# Patient Record
Sex: FEMALE | Race: OTHER | HISPANIC OR LATINO | URBAN - METROPOLITAN AREA
[De-identification: names, ages, dates, MRNs, and addresses within clinical notes are randomized per-mention and may not be internally consistent; named-entity substitution may affect disease eponyms.]

---

## 2017-08-20 ENCOUNTER — INPATIENT (INPATIENT)
Facility: HOSPITAL | Age: 27
LOS: 2 days | Discharge: HOME | DRG: 872 | End: 2017-08-23
Attending: HOSPITALIST | Admitting: HOSPITALIST
Payer: MEDICARE

## 2017-08-20 VITALS
OXYGEN SATURATION: 99 % | TEMPERATURE: 102 F | HEIGHT: 62 IN | HEART RATE: 139 BPM | RESPIRATION RATE: 20 BRPM | DIASTOLIC BLOOD PRESSURE: 92 MMHG | SYSTOLIC BLOOD PRESSURE: 133 MMHG | WEIGHT: 149.91 LBS

## 2017-08-20 DIAGNOSIS — A41.9 SEPSIS, UNSPECIFIED ORGANISM: ICD-10-CM

## 2017-08-20 DIAGNOSIS — D89.9 DISORDER INVOLVING THE IMMUNE MECHANISM, UNSPECIFIED: ICD-10-CM

## 2017-08-20 DIAGNOSIS — Z94.0 KIDNEY TRANSPLANT STATUS: ICD-10-CM

## 2017-08-20 DIAGNOSIS — R50.9 FEVER, UNSPECIFIED: ICD-10-CM

## 2017-08-20 DIAGNOSIS — Z94.4 LIVER TRANSPLANT STATUS: ICD-10-CM

## 2017-08-20 LAB
ALBUMIN SERPL ELPH-MCNC: 3.9 G/DL — SIGNIFICANT CHANGE UP (ref 3.3–5.2)
ALP SERPL-CCNC: 77 U/L — SIGNIFICANT CHANGE UP (ref 40–120)
ALT FLD-CCNC: 41 U/L — HIGH
ANION GAP SERPL CALC-SCNC: 16 MMOL/L — SIGNIFICANT CHANGE UP (ref 5–17)
APPEARANCE UR: ABNORMAL
AST SERPL-CCNC: 46 U/L — HIGH
BACTERIA # UR AUTO: ABNORMAL
BASOPHILS # BLD AUTO: 0 K/UL — SIGNIFICANT CHANGE UP (ref 0–0.2)
BASOPHILS NFR BLD AUTO: 0.1 % — SIGNIFICANT CHANGE UP (ref 0–2)
BILIRUB SERPL-MCNC: 0.5 MG/DL — SIGNIFICANT CHANGE UP (ref 0.4–2)
BILIRUB UR-MCNC: NEGATIVE — SIGNIFICANT CHANGE UP
BUN SERPL-MCNC: 9 MG/DL — SIGNIFICANT CHANGE UP (ref 8–20)
CALCIUM SERPL-MCNC: 9.2 MG/DL — SIGNIFICANT CHANGE UP (ref 8.6–10.2)
CHLORIDE SERPL-SCNC: 100 MMOL/L — SIGNIFICANT CHANGE UP (ref 98–107)
CO2 SERPL-SCNC: 18 MMOL/L — LOW (ref 22–29)
COLOR SPEC: YELLOW — SIGNIFICANT CHANGE UP
CREAT SERPL-MCNC: 0.7 MG/DL — SIGNIFICANT CHANGE UP (ref 0.5–1.3)
DIFF PNL FLD: ABNORMAL
EPI CELLS # UR: SIGNIFICANT CHANGE UP
GLUCOSE SERPL-MCNC: 149 MG/DL — HIGH (ref 70–115)
GLUCOSE UR QL: 100 MG/DL
HCG SERPL-ACNC: <2 MIU/ML — SIGNIFICANT CHANGE UP
HCT VFR BLD CALC: 38.5 % — SIGNIFICANT CHANGE UP (ref 37–47)
HGB BLD-MCNC: 12.6 G/DL — SIGNIFICANT CHANGE UP (ref 12–16)
KETONES UR-MCNC: ABNORMAL
LACTATE BLDV-MCNC: 1.2 MMOL/L — SIGNIFICANT CHANGE UP (ref 0.5–2)
LEUKOCYTE ESTERASE UR-ACNC: ABNORMAL
LYMPHOCYTES # BLD AUTO: 0.9 K/UL — LOW (ref 1–4.8)
LYMPHOCYTES # BLD AUTO: 6.3 % — LOW (ref 20–55)
MAGNESIUM SERPL-MCNC: 1.5 MG/DL — LOW (ref 1.6–2.6)
MCHC RBC-ENTMCNC: 28.1 PG — SIGNIFICANT CHANGE UP (ref 27–31)
MCHC RBC-ENTMCNC: 32.7 G/DL — SIGNIFICANT CHANGE UP (ref 32–36)
MCV RBC AUTO: 85.9 FL — SIGNIFICANT CHANGE UP (ref 81–99)
MONOCYTES # BLD AUTO: 1.4 K/UL — HIGH (ref 0–0.8)
MONOCYTES NFR BLD AUTO: 10 % — SIGNIFICANT CHANGE UP (ref 3–10)
NEUTROPHILS # BLD AUTO: 11.5 K/UL — HIGH (ref 1.8–8)
NEUTROPHILS NFR BLD AUTO: 83.2 % — HIGH (ref 37–73)
NITRITE UR-MCNC: NEGATIVE — SIGNIFICANT CHANGE UP
PH UR: 6.5 — SIGNIFICANT CHANGE UP (ref 5–8)
PLATELET # BLD AUTO: 236 K/UL — SIGNIFICANT CHANGE UP (ref 150–400)
POTASSIUM SERPL-MCNC: 4.5 MMOL/L — SIGNIFICANT CHANGE UP (ref 3.5–5.3)
POTASSIUM SERPL-SCNC: 4.5 MMOL/L — SIGNIFICANT CHANGE UP (ref 3.5–5.3)
PROT SERPL-MCNC: 7.9 G/DL — SIGNIFICANT CHANGE UP (ref 6.6–8.7)
PROT UR-MCNC: NEGATIVE MG/DL — SIGNIFICANT CHANGE UP
RBC # BLD: 4.48 M/UL — SIGNIFICANT CHANGE UP (ref 4.4–5.2)
RBC # FLD: 13.6 % — SIGNIFICANT CHANGE UP (ref 11–15.6)
RBC CASTS # UR COMP ASSIST: ABNORMAL /HPF (ref 0–4)
SODIUM SERPL-SCNC: 134 MMOL/L — LOW (ref 135–145)
SP GR SPEC: 1 — LOW (ref 1.01–1.02)
UROBILINOGEN FLD QL: NEGATIVE MG/DL — SIGNIFICANT CHANGE UP
WBC # BLD: 13.8 K/UL — HIGH (ref 4.8–10.8)
WBC # FLD AUTO: 13.8 K/UL — HIGH (ref 4.8–10.8)
WBC UR QL: ABNORMAL

## 2017-08-20 PROCEDURE — 99223 1ST HOSP IP/OBS HIGH 75: CPT

## 2017-08-20 PROCEDURE — 93010 ELECTROCARDIOGRAM REPORT: CPT

## 2017-08-20 PROCEDURE — 99291 CRITICAL CARE FIRST HOUR: CPT

## 2017-08-20 PROCEDURE — 71010: CPT | Mod: 26

## 2017-08-20 PROCEDURE — 99233 SBSQ HOSP IP/OBS HIGH 50: CPT

## 2017-08-20 PROCEDURE — 74176 CT ABD & PELVIS W/O CONTRAST: CPT | Mod: 26

## 2017-08-20 RX ORDER — LINEZOLID 600 MG/300ML
INJECTION, SOLUTION INTRAVENOUS
Qty: 0 | Refills: 0 | Status: DISCONTINUED | OUTPATIENT
Start: 2017-08-20 | End: 2017-08-23

## 2017-08-20 RX ORDER — ENOXAPARIN SODIUM 100 MG/ML
40 INJECTION SUBCUTANEOUS DAILY
Qty: 0 | Refills: 0 | Status: DISCONTINUED | OUTPATIENT
Start: 2017-08-20 | End: 2017-08-23

## 2017-08-20 RX ORDER — VANCOMYCIN HCL 1 G
1500 VIAL (EA) INTRAVENOUS ONCE
Qty: 0 | Refills: 0 | Status: COMPLETED | OUTPATIENT
Start: 2017-08-20 | End: 2017-08-20

## 2017-08-20 RX ORDER — ERTAPENEM SODIUM 1 G/1
1000 INJECTION, POWDER, LYOPHILIZED, FOR SOLUTION INTRAMUSCULAR; INTRAVENOUS EVERY 24 HOURS
Qty: 0 | Refills: 0 | Status: DISCONTINUED | OUTPATIENT
Start: 2017-08-21 | End: 2017-08-23

## 2017-08-20 RX ORDER — SODIUM CHLORIDE 9 MG/ML
500 INJECTION INTRAMUSCULAR; INTRAVENOUS; SUBCUTANEOUS ONCE
Qty: 0 | Refills: 0 | Status: COMPLETED | OUTPATIENT
Start: 2017-08-20 | End: 2017-08-20

## 2017-08-20 RX ORDER — HYDROMORPHONE HYDROCHLORIDE 2 MG/ML
1 INJECTION INTRAMUSCULAR; INTRAVENOUS; SUBCUTANEOUS ONCE
Qty: 0 | Refills: 0 | Status: DISCONTINUED | OUTPATIENT
Start: 2017-08-20 | End: 2017-08-20

## 2017-08-20 RX ORDER — MAGNESIUM SULFATE 500 MG/ML
2 VIAL (ML) INJECTION ONCE
Qty: 0 | Refills: 0 | Status: COMPLETED | OUTPATIENT
Start: 2017-08-20 | End: 2017-08-20

## 2017-08-20 RX ORDER — MORPHINE SULFATE 50 MG/1
2 CAPSULE, EXTENDED RELEASE ORAL ONCE
Qty: 0 | Refills: 0 | Status: DISCONTINUED | OUTPATIENT
Start: 2017-08-20 | End: 2017-08-20

## 2017-08-20 RX ORDER — TACROLIMUS 5 MG/1
2 CAPSULE ORAL
Qty: 0 | Refills: 0 | Status: DISCONTINUED | OUTPATIENT
Start: 2017-08-20 | End: 2017-08-23

## 2017-08-20 RX ORDER — ERTAPENEM SODIUM 1 G/1
INJECTION, POWDER, LYOPHILIZED, FOR SOLUTION INTRAMUSCULAR; INTRAVENOUS
Qty: 0 | Refills: 0 | Status: DISCONTINUED | OUTPATIENT
Start: 2017-08-20 | End: 2017-08-23

## 2017-08-20 RX ORDER — CEFEPIME 1 G/1
2000 INJECTION, POWDER, FOR SOLUTION INTRAMUSCULAR; INTRAVENOUS ONCE
Qty: 0 | Refills: 0 | Status: COMPLETED | OUTPATIENT
Start: 2017-08-20 | End: 2017-08-20

## 2017-08-20 RX ORDER — MYCOPHENOLIC ACID 180 MG/1
180 TABLET, DELAYED RELEASE ORAL DAILY
Qty: 0 | Refills: 0 | Status: DISCONTINUED | OUTPATIENT
Start: 2017-08-20 | End: 2017-08-21

## 2017-08-20 RX ORDER — ONDANSETRON 8 MG/1
4 TABLET, FILM COATED ORAL EVERY 8 HOURS
Qty: 0 | Refills: 0 | Status: DISCONTINUED | OUTPATIENT
Start: 2017-08-20 | End: 2017-08-23

## 2017-08-20 RX ORDER — SODIUM CHLORIDE 9 MG/ML
1000 INJECTION INTRAMUSCULAR; INTRAVENOUS; SUBCUTANEOUS
Qty: 0 | Refills: 0 | Status: DISCONTINUED | OUTPATIENT
Start: 2017-08-20 | End: 2017-08-21

## 2017-08-20 RX ORDER — LINEZOLID 600 MG/300ML
600 INJECTION, SOLUTION INTRAVENOUS ONCE
Qty: 0 | Refills: 0 | Status: COMPLETED | OUTPATIENT
Start: 2017-08-20 | End: 2017-08-20

## 2017-08-20 RX ORDER — ACETAMINOPHEN 500 MG
650 TABLET ORAL EVERY 6 HOURS
Qty: 0 | Refills: 0 | Status: DISCONTINUED | OUTPATIENT
Start: 2017-08-20 | End: 2017-08-23

## 2017-08-20 RX ORDER — ERTAPENEM SODIUM 1 G/1
1000 INJECTION, POWDER, LYOPHILIZED, FOR SOLUTION INTRAMUSCULAR; INTRAVENOUS ONCE
Qty: 0 | Refills: 0 | Status: COMPLETED | OUTPATIENT
Start: 2017-08-20 | End: 2017-08-20

## 2017-08-20 RX ORDER — LINEZOLID 600 MG/300ML
600 INJECTION, SOLUTION INTRAVENOUS EVERY 12 HOURS
Qty: 0 | Refills: 0 | Status: DISCONTINUED | OUTPATIENT
Start: 2017-08-21 | End: 2017-08-23

## 2017-08-20 RX ORDER — ONDANSETRON 8 MG/1
4 TABLET, FILM COATED ORAL ONCE
Qty: 0 | Refills: 0 | Status: COMPLETED | OUTPATIENT
Start: 2017-08-20 | End: 2017-08-20

## 2017-08-20 RX ORDER — ACETAMINOPHEN 500 MG
650 TABLET ORAL ONCE
Qty: 0 | Refills: 0 | Status: COMPLETED | OUTPATIENT
Start: 2017-08-20 | End: 2017-08-20

## 2017-08-20 RX ORDER — MAGNESIUM SULFATE 500 MG/ML
1 VIAL (ML) INJECTION ONCE
Qty: 0 | Refills: 0 | Status: COMPLETED | OUTPATIENT
Start: 2017-08-20 | End: 2017-08-20

## 2017-08-20 RX ORDER — FENTANYL CITRATE 50 UG/ML
50 INJECTION INTRAVENOUS ONCE
Qty: 0 | Refills: 0 | Status: DISCONTINUED | OUTPATIENT
Start: 2017-08-20 | End: 2017-08-20

## 2017-08-20 RX ORDER — SODIUM CHLORIDE 9 MG/ML
3 INJECTION INTRAMUSCULAR; INTRAVENOUS; SUBCUTANEOUS ONCE
Qty: 0 | Refills: 0 | Status: COMPLETED | OUTPATIENT
Start: 2017-08-20 | End: 2017-08-20

## 2017-08-20 RX ADMIN — SODIUM CHLORIDE 3 MILLILITER(S): 9 INJECTION INTRAMUSCULAR; INTRAVENOUS; SUBCUTANEOUS at 05:54

## 2017-08-20 RX ADMIN — Medication 5 MILLIGRAM(S): at 18:38

## 2017-08-20 RX ADMIN — MYCOPHENOLIC ACID 180 MILLIGRAM(S): 180 TABLET, DELAYED RELEASE ORAL at 18:38

## 2017-08-20 RX ADMIN — TACROLIMUS 2 MILLIGRAM(S): 5 CAPSULE ORAL at 18:38

## 2017-08-20 RX ADMIN — ERTAPENEM SODIUM 120 MILLIGRAM(S): 1 INJECTION, POWDER, LYOPHILIZED, FOR SOLUTION INTRAMUSCULAR; INTRAVENOUS at 12:32

## 2017-08-20 RX ADMIN — SODIUM CHLORIDE 500 MILLILITER(S): 9 INJECTION INTRAMUSCULAR; INTRAVENOUS; SUBCUTANEOUS at 06:30

## 2017-08-20 RX ADMIN — FENTANYL CITRATE 50 MICROGRAM(S): 50 INJECTION INTRAVENOUS at 06:10

## 2017-08-20 RX ADMIN — MORPHINE SULFATE 2 MILLIGRAM(S): 50 CAPSULE, EXTENDED RELEASE ORAL at 11:20

## 2017-08-20 RX ADMIN — SODIUM CHLORIDE 150 MILLILITER(S): 9 INJECTION INTRAMUSCULAR; INTRAVENOUS; SUBCUTANEOUS at 20:38

## 2017-08-20 RX ADMIN — HYDROMORPHONE HYDROCHLORIDE 1 MILLIGRAM(S): 2 INJECTION INTRAMUSCULAR; INTRAVENOUS; SUBCUTANEOUS at 07:18

## 2017-08-20 RX ADMIN — SODIUM CHLORIDE 500 MILLILITER(S): 9 INJECTION INTRAMUSCULAR; INTRAVENOUS; SUBCUTANEOUS at 06:41

## 2017-08-20 RX ADMIN — SODIUM CHLORIDE 150 MILLILITER(S): 9 INJECTION INTRAMUSCULAR; INTRAVENOUS; SUBCUTANEOUS at 18:43

## 2017-08-20 RX ADMIN — ONDANSETRON 4 MILLIGRAM(S): 8 TABLET, FILM COATED ORAL at 05:54

## 2017-08-20 RX ADMIN — CEFEPIME 100 MILLIGRAM(S): 1 INJECTION, POWDER, FOR SOLUTION INTRAMUSCULAR; INTRAVENOUS at 06:45

## 2017-08-20 RX ADMIN — LINEZOLID 300 MILLIGRAM(S): 600 INJECTION, SOLUTION INTRAVENOUS at 14:36

## 2017-08-20 RX ADMIN — Medication 650 MILLIGRAM(S): at 11:19

## 2017-08-20 RX ADMIN — ONDANSETRON 4 MILLIGRAM(S): 8 TABLET, FILM COATED ORAL at 20:37

## 2017-08-20 RX ADMIN — SODIUM CHLORIDE 500 MILLILITER(S): 9 INJECTION INTRAMUSCULAR; INTRAVENOUS; SUBCUTANEOUS at 05:54

## 2017-08-20 RX ADMIN — MORPHINE SULFATE 2 MILLIGRAM(S): 50 CAPSULE, EXTENDED RELEASE ORAL at 11:35

## 2017-08-20 RX ADMIN — HYDROMORPHONE HYDROCHLORIDE 1 MILLIGRAM(S): 2 INJECTION INTRAMUSCULAR; INTRAVENOUS; SUBCUTANEOUS at 21:30

## 2017-08-20 RX ADMIN — FENTANYL CITRATE 50 MICROGRAM(S): 50 INJECTION INTRAVENOUS at 05:54

## 2017-08-20 RX ADMIN — HYDROMORPHONE HYDROCHLORIDE 1 MILLIGRAM(S): 2 INJECTION INTRAMUSCULAR; INTRAVENOUS; SUBCUTANEOUS at 20:36

## 2017-08-20 RX ADMIN — Medication 250 MILLIGRAM(S): at 04:43

## 2017-08-20 RX ADMIN — ENOXAPARIN SODIUM 40 MILLIGRAM(S): 100 INJECTION SUBCUTANEOUS at 12:53

## 2017-08-20 RX ADMIN — ONDANSETRON 4 MILLIGRAM(S): 8 TABLET, FILM COATED ORAL at 11:20

## 2017-08-20 RX ADMIN — Medication 50 GRAM(S): at 09:08

## 2017-08-20 RX ADMIN — Medication 650 MILLIGRAM(S): at 20:38

## 2017-08-20 RX ADMIN — SODIUM CHLORIDE 150 MILLILITER(S): 9 INJECTION INTRAMUSCULAR; INTRAVENOUS; SUBCUTANEOUS at 12:32

## 2017-08-20 RX ADMIN — SODIUM CHLORIDE 500 MILLILITER(S): 9 INJECTION INTRAMUSCULAR; INTRAVENOUS; SUBCUTANEOUS at 06:45

## 2017-08-20 RX ADMIN — SODIUM CHLORIDE 500 MILLILITER(S): 9 INJECTION INTRAMUSCULAR; INTRAVENOUS; SUBCUTANEOUS at 09:10

## 2017-08-20 RX ADMIN — Medication 100 GRAM(S): at 18:43

## 2017-08-20 NOTE — ED ADULT NURSE REASSESSMENT NOTE - COMFORT CARE
plan of care explained/wait time explained
wait time explained/plan of care explained
repositioned/plan of care explained/po fluids offered

## 2017-08-20 NOTE — ED PROVIDER NOTE - MEDICAL DECISION MAKING DETAILS
labs, ivf, antibiotics, likely transfer to [facility] [phone] labs, ivf, antibiotics.

## 2017-08-20 NOTE — PHARMACY COMMUNICATION NOTE - COMMENTS
Spoke with Dr. Silverio Archer regarding dosing of myfortic (usually twice daily). She stated that she is continuing the patients home doses and this is what the patient was on PTA.

## 2017-08-20 NOTE — ED PROVIDER NOTE - CARE PLAN
Principal Discharge DX:	Sepsis, due to unspecified organism  Secondary Diagnosis:	Kidney transplant recipient  Secondary Diagnosis:	Liver transplant recipient

## 2017-08-20 NOTE — CONSULT NOTE ADULT - ASSESSMENT
This 26 y.o. F with liver and kidney transplant here for fevers, abd pain at transplant site. Concern for pyelonephritis

## 2017-08-20 NOTE — CONSULT NOTE ADULT - PROBLEM SELECTOR RECOMMENDATION 9
- workup in process  - possible pyelo  - continue invanz  - ADDED Linezolid  - RENAL CONSULT  - needs ultrasound of RENAL TRANSPLANT

## 2017-08-20 NOTE — CONSULT NOTE ADULT - SUBJECTIVE AND OBJECTIVE BOX
[facility] INFECTIOUS DISEASES AND INTERNAL MEDICINE OF [place]  =======================================================  Anton Horan MD  Diplomates American Board of Internal Medicine and Infectious Diseases  =======================================================    MRN-[id number]  ATTILA GARCIA is a 26y  Female   This 26 y.o. F with lever and kidney transplant.     =======================================================  Past Medical & Surgical Hx:  =====================  PAST MEDICAL & SURGICAL HISTORY:  Liver transplant recipient  Kidney transplant recipient       Problem List:  ==========  HEALTH ISSUES - PROBLEM Dx:       Social Hx:  =======  no toxic habits currently      FAMILY HISTORY:  No pertinent family history in first degree relatives      Allergies    iv contrast (Anaphylaxis)  Motrin (Other)    Intolerances        MEDICATIONS  (STANDING):  ertapenem  IVPB   IV Intermittent   enoxaparin Injectable 40 milliGRAM(s) SubCutaneous daily  magnesium sulfate  IVPB 1 Gram(s) IV Intermittent once  sodium chloride 0.9%. 1000 milliLiter(s) (150 mL/Hr) IV Continuous <Continuous>  tacrolimus 2 milliGRAM(s) Oral two times a day  predniSONE   Tablet 5 milliGRAM(s) Oral daily  mycophenolic Acid 180 milliGRAM(s) Oral daily    MEDICATIONS  (PRN):        =======================================================  REVIEW OF SYSTEMS:  Constitutional: No fever, No chills, No sweats.  Eye: No icterus, No double vision.  Ear/Nose/Mouth/Throat: No nasal congestion, No sore throat.  Respiratory: No shortness of breath, No cough, No sputum production, No wheezing.  Cardiovascular: No chest pain, No palpitations, No syncope.  Gastrointestinal: No nausea, No vomiting, No diarrhea, No abdominal pain.  Genitourinary: No dysuria, No hematuria, No change in urine stream.  Hematology/Lymphatics: No bleeding tendency.  Endocrine: No excessive thirst, No polyuria.  Immunologic: No malaise.  Musculoskeletal: No back pain, No neck pain, No joint pain, No muscle pain.  Integumentary: No rash, No pruritus, No skin lesion.  Neurologic: No numbness, No tingling, No headache.  Psychiatric: No depression.    =======================================================  I&O's Detail      Physical Exam:  ============  Vital Signs Last 24 Hrs  T(C): 38.1 (20 Aug 2017 10:30), Max: 39 (20 Aug 2017 04:43)  T(F): 100.6 (20 Aug 2017 10:30), Max: 102.2 (20 Aug 2017 04:43)  HR: 119 (20 Aug 2017 10:30) (117 - 139)  BP: 126/75 (20 Aug 2017 10:30) (125/78 - 135/84)  BP(mean): --  RR: 20 (20 Aug 2017 10:30) (20 - 20)  SpO2: 96% (20 Aug 2017 10:30) (96% - 100%)  Height (cm): 157.48 ( @ 04:24)  Weight (kg): 68 ( @ :24)  BMI (kg/m2): 27.4 ( @ 04:24)  BSA (m2): 1.69 ( @ 04:24)  General: Alert and oriented, No acute distress.  Eye: Pupils are equal, round and reactive to light, Extraocular movements are intact, Normal conjunctiva.  HENT: Normocephalic, Oral mucosa is moist, No pharyngeal erythema, No sinus tenderness.  Neck: Supple, No lymphadenopathy.  Respiratory: Lungs are clear to auscultation, Respirations are non-labored.  Cardiovascular: Normal rate, Regular rhythm, No murmur, Good pulses equal in all extremities, No edema.  Gastrointestinal: Soft, Non-tender, Non-distended, Normal bowel sounds.  Genitourinary: No costovertebral angle tenderness.  Lymphatics: No lymphadenopathy neck, axilla, groin.  Musculoskeletal: Normal range of motion, Normal strength.  Integumentary: No rash.  Neurologic: Alert, Oriented, No focal deficits, Cranial Nerves II-XII are grossly intact.  Psychiatric: Appropriate mood & affect.      =======================================================  Labs:  ====    Labs:      134<L>  |  100  |  9.0  ----------------------------<  149<H>  4.5   |  18.0<L>  |  0.70    Ca    9.2      20 Aug 2017 05:02  Mg     1.5         TPro  7.9  /  Alb  3.9  /  TBili  0.5  /  DBili  x   /  AST  46<H>  /  ALT  41<H>  /  AlkPhos  77                            12.6   13.8  )-----------( 236      ( 20 Aug 2017 05:55 )             38.5         Urinalysis Basic - ( 20 Aug 2017 06:00 )    Color: Yellow / Appearance: x / S.005 / pH: x  Gluc: x / Ketone: Trace  / Bili: Negative / Urobili: Negative mg/dL   Blood: x / Protein: Negative mg/dL / Nitrite: Negative   Leuk Esterase: Trace / RBC: 3-5 /HPF / WBC 6-10   Sq Epi: x / Non Sq Epi: x / Bacteria: Few      LIVER FUNCTIONS - ( 20 Aug 2017 05:02 )  Alb: 3.9 g/dL / Pro: 7.9 g/dL / ALK PHOS: 77 U/L / ALT: 41 U/L / AST: 46 U/L / GGT: x             [facility] INFECTIOUS DISEASES AND INTERNAL MEDICINE OF [place]  =======================================================  Anton Horan MD  Diplomates American Board of Internal Medicine and Infectious Diseases  =======================================================    [id number]  ATTILA GARCIA is a 26y  Female     =======================================================  Past Medical & Surgical Hx:  =====================  PAST MEDICAL & SURGICAL HISTORY:  Liver transplant recipient  Kidney transplant recipient  No significant past surgical history      Problem List:  ==========  HEALTH ISSUES - PROBLEM Dx:          Social Hx:  =======  no toxic habits currently      FAMILY HISTORY:  No pertinent family history in first degree relatives      Allergies    iv contrast (Anaphylaxis)  Motrin (Other)    Intolerances        MEDICATIONS  (STANDING):  ertapenem  IVPB   IV Intermittent   enoxaparin Injectable 40 milliGRAM(s) SubCutaneous daily  magnesium sulfate  IVPB 1 Gram(s) IV Intermittent once  sodium chloride 0.9%. 1000 milliLiter(s) (150 mL/Hr) IV Continuous <Continuous>  tacrolimus 2 milliGRAM(s) Oral two times a day  predniSONE   Tablet 5 milliGRAM(s) Oral daily  mycophenolic Acid 180 milliGRAM(s) Oral daily    MEDICATIONS  (PRN):        =======================================================  REVIEW OF SYSTEMS:  Constitutional: No fever, No chills, No sweats.  Eye: No icterus, No double vision.  Ear/Nose/Mouth/Throat: No nasal congestion, No sore throat.  Respiratory: No shortness of breath, No cough, No sputum production, No wheezing.  Cardiovascular: No chest pain, No palpitations, No syncope.  Gastrointestinal: No nausea, No vomiting, No diarrhea, No abdominal pain.  Genitourinary: No dysuria, No hematuria, No change in urine stream.  Hematology/Lymphatics: No bleeding tendency.  Endocrine: No excessive thirst, No polyuria.  Immunologic: No malaise.  Musculoskeletal: No back pain, No neck pain, No joint pain, No muscle pain.  Integumentary: No rash, No pruritus, No skin lesion.  Neurologic: No numbness, No tingling, No headache.  Psychiatric: No depression.    =======================================================  I&O's Detail      Physical Exam:  ============  Vital Signs Last 24 Hrs  T(C): 38.1 (20 Aug 2017 10:30), Max: 39 (20 Aug 2017 04:43)  T(F): 100.6 (20 Aug 2017 10:30), Max: 102.2 (20 Aug 2017 04:43)  HR: 119 (20 Aug 2017 10:30) (117 - 139)  BP: 126/75 (20 Aug 2017 10:30) (125/78 - 135/84)  BP(mean): --  RR: 20 (20 Aug 2017 10:30) (20 - 20)  SpO2: 96% (20 Aug 2017 10:30) (96% - 100%)  Height (cm): 157.48 ( @ 04:24)  Weight (kg): 68 ( @ 04:24)  BMI (kg/m2): 27.4 ( @ 04:24)  BSA (m2): 1.69 ( @ 04:24)      General: Alert and oriented, No acute distress.  Eye: Pupils are equal, round and reactive to light, Extraocular movements are intact, Normal conjunctiva.  HENT: Normocephalic, Oral mucosa is moist, No pharyngeal erythema, No sinus tenderness.  Neck: Supple, No lymphadenopathy.  Respiratory: Lungs are clear to auscultation, Respirations are non-labored.  Cardiovascular: Normal rate, Regular rhythm, No murmur, Good pulses equal in all extremities, No edema.  Gastrointestinal: Soft, Non-tender, Non-distended, Normal bowel sounds.  Genitourinary: No costovertebral angle tenderness.  Lymphatics: No lymphadenopathy neck, axilla, groin.  Musculoskeletal: Normal range of motion, Normal strength.  Integumentary: No rash.  Neurologic: Alert, Oriented, No focal deficits, Cranial Nerves II-XII are grossly intact.  Psychiatric: Appropriate mood & affect.      =======================================================  Labs:  ====    Labs:      134<L>  |  100  |  9.0  ----------------------------<  149<H>  4.5   |  18.0<L>  |  0.70    Ca    9.2      20 Aug 2017 05:02  Mg     1.5         TPro  7.9  /  Alb  3.9  /  TBili  0.5  /  DBili  x   /  AST  46<H>  /  ALT  41<H>  /  AlkPhos  77                            12.6   13.8  )-----------( 236      ( 20 Aug 2017 05:55 )             38.5         Urinalysis Basic - ( 20 Aug 2017 06:00 )    Color: Yellow / Appearance: x / S.005 / pH: x  Gluc: x / Ketone: Trace  / Bili: Negative / Urobili: Negative mg/dL   Blood: x / Protein: Negative mg/dL / Nitrite: Negative   Leuk Esterase: Trace / RBC: 3-5 /HPF / WBC 6-10   Sq Epi: x / Non Sq Epi: x / Bacteria: Few      LIVER FUNCTIONS - ( 20 Aug 2017 05:02 )  Alb: 3.9 g/dL / Pro: 7.9 g/dL / ALK PHOS: 77 U/L / ALT: 41 U/L / AST: 46 U/L / GGT: x                       ========================  EXAM:  CHEST SINGLE VIEW FRONTAL                          PROCEDURE DATE:  2017      INTERPRETATION:  Chest radiograph (one view)     CPT 57938    CLINICAL INFORMATION:  Patient is unable to communicate. Fever.  Short of   breath.     TECHNIQUE:  Single frontal view of the chest was obtained.    FINDINGS:  No previous examinations are available for review.    The lungs are clear.  No pleural abnormality is seen.      The heart and mediastinum appear intact.           IMPRESSION: No evidence of active chest disease.             MEGHNA NOWAK M.D., ATTENDING RADIOLOGIST  This document has been electronically signed. Aug 20 2017 10:53AM        ====================   EXAM:  CT ABDOMEN AND PELVIS                          PROCEDURE DATE:  2017          INTERPRETATION:  Clinical information: Severe lower abdominal pain    Comparison: None    PROCEDURE:     CT of the Abdomen and Pelvis was performed without intravenous contrast.    Evaluation of abdominal and pelvic viscera, lymph nodes and vasculature   is limited without intravenous contrast.    FINDINGS:    LOWER CHEST: within normal limits    ABDOMEN:  LIVER: Pneumobilia. Surgical sutures in the luz hepatis suggesting   prior instrumentation.  BILE DUCTS: normal caliber  GALLBLADDER: Cholecystectomy  PANCREAS: within normal limits  SPLEEN: within normal limits  ADRENALS: within normal limits  KIDNEYS: Atrophic native kidneys. Right lower quadrant renal transplant,   no hydronephrosis.     PELVIS:  REPRODUCTIVE ORGANS: Small right pelvic and adnexal free fluid. 2 cm   cystic structure in the right pelvis in the vicinity of the surgical   clips, seroma versus adnexal cyst.  BLADDER: within normal limits    BOWEL: within normal limits, normal appendix. Small bowel anastomotic   suture right lower quadrant.  PERITONEUM: no ascites or free air, no fluid collection  RETROPERITONEUM: no enlarged retroperitoneal or pelvic nodes.    VESSELS: within normal limits for a noncontrast study.  ABDOMINAL WALL: within normal limits.  MUSCULOSKELETAL: within normal limits.    IMPRESSION:     No acute findings provided the limitation of a noncontrast technique.     Multiple postsurgical changes in the upper and lower abdomen.    2 cm right pelvic cyst and small free pelvic fluid as described above.    Other incidental comments as above.                OSVALDO SEGOVIA M.D., ATTENDING RADIOLOGIST  This document has been electronically signed. Aug 20 2017  9:59AM [facility] INFECTIOUS DISEASES [facility] INTERNAL MEDICINE OF [place]  =======================================================  Anton Spencer MD FACP   Jose Horan MD  Diplomates American Board of Internal Medicine and Infectious Diseases  =======================================================    N-[id number]  ATTILA GARCIA is a 26y  Female   This 26 y.o. F with liver transplant in  after tylenol OD for attempted suicide, complicated by renal failure, eventually had kidney transplant in 2016, at [facility].  She is here currently on vacation visiting boyfriend at [place].   Developed high graded fever 102.5 on Saturday (1 day prior) increased fatigue, sleeping more, and came here for evaluation.   Reports some nausea, and pain along the RIGHT groin at implanted kidney site.  She is on Myfortic, Prednisone, Tacrolimus.       =======================================================  Past Medical & Surgical Hx:  =====================  PAST MEDICAL & SURGICAL HISTORY:  Liver transplant recipient  Kidney transplant recipient       Problem List:  ==========  HEALTH ISSUES - PROBLEM Dx:       Social Hx:  =======  no toxic habits currently    FAMILY HISTORY:  No pertinent family history in first degree relatives  All family members are alive and well.     Allergies    iv contrast (Anaphylaxis)  Motrin (Other)    Intolerances        MEDICATIONS  (STANDING):  ertapenem  IVPB   IV Intermittent   enoxaparin Injectable 40 milliGRAM(s) SubCutaneous daily  magnesium sulfate  IVPB 1 Gram(s) IV Intermittent once  sodium chloride 0.9%. 1000 milliLiter(s) (150 mL/Hr) IV Continuous <Continuous>  tacrolimus 2 milliGRAM(s) Oral two times a day  predniSONE   Tablet 5 milliGRAM(s) Oral daily  mycophenolic Acid 180 milliGRAM(s) Oral daily    MEDICATIONS  (PRN):        =======================================================  REVIEW OF SYSTEMS:  Constitutional: No fever, No chills, No sweats.  Eye: No icterus, No double vision.  Ear/Nose/Mouth/Throat: No nasal congestion, No sore throat.  Respiratory: No shortness of breath, No cough, No sputum production, No wheezing.  Cardiovascular: No chest pain, No palpitations, No syncope.  Gastrointestinal: No nausea, No vomiting, No diarrhea, No abdominal pain.  Genitourinary: No dysuria, No hematuria, No change in urine stream.  PAIN IN RIGHT LOWER ABD  Hematology/Lymphatics: No bleeding tendency.  Endocrine: No excessive thirst, No polyuria.  Immunologic: No malaise.  Musculoskeletal: No back pain, No neck pain, No joint pain, No muscle pain.  Integumentary: No rash, No pruritus, No skin lesion.  Neurologic: No numbness, No tingling, No headache.  Psychiatric: No depression.    =======================================================  I&O's Detail      Physical Exam:  ============  Vital Signs Last 24 Hrs  T(C): 38.1 (20 Aug 2017 10:30), Max: 39 (20 Aug 2017 04:43)  T(F): 100.6 (20 Aug 2017 10:30), Max: 102.2 (20 Aug 2017 04:43)  HR: 119 (20 Aug 2017 10:30) (117 - 139)  BP: 126/75 (20 Aug 2017 10:30) (125/78 - 135/84)  RR: 20 (20 Aug 2017 10:30) (20 - 20)  SpO2: 96% (20 Aug 2017 10:30) (96% - 100%)  Height (cm): 157.48 ( @ 04:24)  Weight (kg): 68 ( @ :24)  BMI (kg/m2): 27.4 ( @ :24)  BSA (m2): 1.69 ( @ 04:24)    General: Alert and oriented, No acute distress.  Eye: Pupils are equal, round and reactive to light, Extraocular movements are intact, Normal conjunctiva.  HENT: Normocephalic, Oral mucosa is moist, No pharyngeal erythema, No sinus tenderness.  Neck: Supple, No lymphadenopathy.  Respiratory: Lungs are clear to auscultation, Respirations are non-labored.  Cardiovascular: Normal rate, Regular rhythm, No murmur, Good pulses equal in all extremities, No edema.  Gastrointestinal: Soft, Non-tender, Non-distended, Normal bowel sounds.  \lower abd WITH INCISIONAL SCAR., MILD TENDERNESS TO PALPATION OF IMPLANTED KIDNEY.   Genitourinary: No costovertebral angle tenderness.  Lymphatics: No lymphadenopathy neck, axilla, groin.  Musculoskeletal: Normal range of motion, Normal strength.  Integumentary: No rash. MULTIPLE ABD TATTOOS  Neurologic: Alert, Oriented, No focal deficits, Cranial Nerves II-XII are grossly intact.  Psychiatric: Appropriate mood & affect.      =======================================================  Labs:  ====    Labs:      134<L>  |  100  |  9.0  ----------------------------<  149<H>  4.5   |  18.0<L>  |  0.70    Ca    9.2      20 Aug 2017 05:02  Mg     1.5         TPro  7.9  /  Alb  3.9  /  TBili  0.5  /  DBili  x   /  AST  46<H>  /  ALT  41<H>  /  AlkPhos  77                            12.6   13.8  )-----------( 236      ( 20 Aug 2017 05:55 )             38.5         Urinalysis Basic - ( 20 Aug 2017 06:00 )    Color: Yellow / Appearance: x / S.005 / pH: x  Gluc: x / Ketone: Trace  / Bili: Negative / Urobili: Negative mg/dL   Blood: x / Protein: Negative mg/dL / Nitrite: Negative   Leuk Esterase: Trace / RBC: 3-5 /HPF / WBC 6-10   Sq Epi: x / Non Sq Epi: x / Bacteria: Few      LIVER FUNCTIONS - ( 20 Aug 2017 05:02 )  Alb: 3.9 g/dL / Pro: 7.9 g/dL / ALK PHOS: 77 U/L / ALT: 41 U/L / AST: 46 U/L / GGT: x                 ========================  EXAM:  CHEST SINGLE VIEW FRONTAL                          PROCEDURE DATE:  2017      INTERPRETATION:  Chest radiograph (one view)     CPT [id number]    CLINICAL INFORMATION:  Patient is unable to communicate. Fever.  Short of   breath.     TECHNIQUE:  Single frontal view of the chest was obtained.    FINDINGS:  No previous examinations are available for review.    The lungs are clear.  No pleural abnormality is seen.      The heart and mediastinum appear intact.           IMPRESSION: No evidence of active chest disease.             MEGHNA NOWAK M.D., ATTENDING RADIOLOGIST  This document has been electronically signed. Aug 20 2017 10:53AM        ====================   EXAM:  CT ABDOMEN AND PELVIS                          PROCEDURE DATE:  2017          INTERPRETATION:  Clinical information: Severe lower abdominal pain    Comparison: None    PROCEDURE:     CT of the Abdomen and Pelvis was performed without intravenous contrast.    Evaluation of abdominal and pelvic viscera, lymph nodes and vasculature   is limited without intravenous contrast.    FINDINGS:    LOWER CHEST: within normal limits    ABDOMEN:  LIVER: Pneumobilia. Surgical sutures in the luz hepatis suggesting   prior instrumentation.  BILE DUCTS: normal caliber  GALLBLADDER: Cholecystectomy  PANCREAS: within normal limits  SPLEEN: within normal limits  ADRENALS: within normal limits  KIDNEYS: Atrophic native kidneys. Right lower quadrant renal transplant,   no hydronephrosis.     PELVIS:  REPRODUCTIVE ORGANS: Small right pelvic and adnexal free fluid. 2 cm   cystic structure in the right pelvis in the vicinity of the surgical   clips, seroma versus adnexal cyst.  BLADDER: within normal limits    BOWEL: within normal limits, normal appendix. Small bowel anastomotic   suture right lower quadrant.  PERITONEUM: no ascites or free air, no fluid collection  RETROPERITONEUM: no enlarged retroperitoneal or pelvic nodes.    VESSELS: within normal limits for a noncontrast study.  ABDOMINAL WALL: within normal limits.  MUSCULOSKELETAL: within normal limits.    IMPRESSION:     No acute findings provided the limitation of a noncontrast technique.     Multiple postsurgical changes in the upper and lower abdomen.    2 cm right pelvic cyst and small free pelvic fluid as described above.    Other incidental comments as above.                OSVALDO SEGOVIA M.D., ATTENDING RADIOLOGIST  This document has been electronically signed. Aug 20 2017  9:59AM

## 2017-08-20 NOTE — ED ADULT NURSE REASSESSMENT NOTE - GENERAL PATIENT STATE
comfortable appearance/cooperative
cooperative/comfortable appearance
comfortable appearance

## 2017-08-20 NOTE — ED PROVIDER NOTE - CRITICAL CARE PROVIDED
documentation/interpretation of diagnostic studies/consultation with other physicians/additional history taking/direct patient care (not related to procedure)

## 2017-08-20 NOTE — ED ADULT NURSE REASSESSMENT NOTE - GASTROINTESTINAL WDL
Abdomen soft, nontender, nondistended, bowel sounds present in all 4 quadrants.
Abdomen soft, tender, nondistended, bowel sounds present in all 4 quadrants.

## 2017-08-20 NOTE — H&P ADULT - ASSESSMENT
26 yr old female on immunosuppression for renal transplant and prior history of liver transplant - now with sepsis secondary to UTi, started Invanz will follow cultures , called ID Dr recinos for consult

## 2017-08-20 NOTE — ED PROVIDER NOTE - PHYSICAL EXAMINATION
VS: reviewed in triage note...  HEENT: NC/AT,  dry mucous membranes  CV:s1,s2 no m/r/g  Lungs: cta b/l, no r/r/w  Abd: soft, nt/nd, +bs  EXT: no c/c/e  Neuro:no focal defecits  skin: no rash  Pulses: dpp, pt 2+ b/l

## 2017-08-20 NOTE — ED ADULT TRIAGE NOTE - CHIEF COMPLAINT QUOTE
c/o chills fever 102.5F taken tylenol q4hrs, last adm was about 0230 this am,  pain around her incision s/p kidney transplant 8 month ago  pt states had sepis x3

## 2017-08-20 NOTE — H&P ADULT - HISTORY OF PRESENT ILLNESS
patient is a 26 yr old female with very significant medical history of Liver transplant at the age of 13 and renal transplant appx 6 months go in 12/2016. now for a week was feeling week and  and was asked to hold her doxycycline and her nitrofurantoin but after she her her meds for almost a week she felt worse and so started her nitrofurantoin yesterday and came in to hospital today when she didn't feel better . No reliving or exacerbating conditions, symptoms severe in intensity

## 2017-08-20 NOTE — H&P ADULT - NSHPPHYSICALEXAM_GEN_ALL_CORE
GENERAL: NAD, well-groomed, well-developed  HEAD:  Atraumatic, Normocephalic, younf ill appearing female   EYES: EOMI, PERRLA, conjunctiva and sclera clear  ENMT: No tonsillar erythema, exudates, or enlargement; Moist mucous membranes, Good dentition, No lesions  NECK: Supple, No JVD, Normal thyroid  NERVOUS SYSTEM:  Alert & Oriented X3, Good concentration; Motor Strength 5/5 B/L upper and lower extremities;   CHEST/LUNG: Clear to percussion bilaterally; No rales, rhonchi, wheezing, or rubs  HEART: Regular rate and rhythm; No murmurs, rubs, or gallops  ABDOMEN: Soft, Nontender, Nondistended; Bowel sounds present  EXTREMITIES:  2+ Peripheral Pulses, No clubbing, cyanosis, or edema  LYMPH: No lymphadenopathy noted  SKIN: No rashes or lesions

## 2017-08-21 LAB
ALBUMIN SERPL ELPH-MCNC: 3.3 G/DL — SIGNIFICANT CHANGE UP (ref 3.3–5.2)
ALP SERPL-CCNC: 65 U/L — SIGNIFICANT CHANGE UP (ref 40–120)
ALT FLD-CCNC: 29 U/L — SIGNIFICANT CHANGE UP
ANION GAP SERPL CALC-SCNC: 14 MMOL/L — SIGNIFICANT CHANGE UP (ref 5–17)
AST SERPL-CCNC: 19 U/L — SIGNIFICANT CHANGE UP
BASOPHILS # BLD AUTO: 0 K/UL — SIGNIFICANT CHANGE UP (ref 0–0.2)
BASOPHILS NFR BLD AUTO: 0.1 % — SIGNIFICANT CHANGE UP (ref 0–2)
BILIRUB SERPL-MCNC: 0.3 MG/DL — LOW (ref 0.4–2)
BUN SERPL-MCNC: 5 MG/DL — LOW (ref 8–20)
CALCIUM SERPL-MCNC: 8.8 MG/DL — SIGNIFICANT CHANGE UP (ref 8.6–10.2)
CHLORIDE SERPL-SCNC: 105 MMOL/L — SIGNIFICANT CHANGE UP (ref 98–107)
CO2 SERPL-SCNC: 20 MMOL/L — LOW (ref 22–29)
CREAT SERPL-MCNC: 0.62 MG/DL — SIGNIFICANT CHANGE UP (ref 0.5–1.3)
EOSINOPHIL # BLD AUTO: 0 K/UL — SIGNIFICANT CHANGE UP (ref 0–0.5)
EOSINOPHIL NFR BLD AUTO: 0.1 % — SIGNIFICANT CHANGE UP (ref 0–6)
GLUCOSE SERPL-MCNC: 102 MG/DL — SIGNIFICANT CHANGE UP (ref 70–115)
HCT VFR BLD CALC: 39.5 % — SIGNIFICANT CHANGE UP (ref 37–47)
HGB BLD-MCNC: 12.6 G/DL — SIGNIFICANT CHANGE UP (ref 12–16)
LYMPHOCYTES # BLD AUTO: 1.3 K/UL — SIGNIFICANT CHANGE UP (ref 1–4.8)
LYMPHOCYTES # BLD AUTO: 12.4 % — LOW (ref 20–55)
MCHC RBC-ENTMCNC: 28.1 PG — SIGNIFICANT CHANGE UP (ref 27–31)
MCHC RBC-ENTMCNC: 31.9 G/DL — LOW (ref 32–36)
MCV RBC AUTO: 88.2 FL — SIGNIFICANT CHANGE UP (ref 81–99)
MONOCYTES # BLD AUTO: 1.3 K/UL — HIGH (ref 0–0.8)
MONOCYTES NFR BLD AUTO: 13.2 % — HIGH (ref 3–10)
NEUTROPHILS # BLD AUTO: 7.5 K/UL — SIGNIFICANT CHANGE UP (ref 1.8–8)
NEUTROPHILS NFR BLD AUTO: 73.9 % — HIGH (ref 37–73)
PLATELET # BLD AUTO: 241 K/UL — SIGNIFICANT CHANGE UP (ref 150–400)
POTASSIUM SERPL-MCNC: 4.4 MMOL/L — SIGNIFICANT CHANGE UP (ref 3.5–5.3)
POTASSIUM SERPL-SCNC: 4.4 MMOL/L — SIGNIFICANT CHANGE UP (ref 3.5–5.3)
PROT SERPL-MCNC: 6.7 G/DL — SIGNIFICANT CHANGE UP (ref 6.6–8.7)
RBC # BLD: 4.48 M/UL — SIGNIFICANT CHANGE UP (ref 4.4–5.2)
RBC # FLD: 13.7 % — SIGNIFICANT CHANGE UP (ref 11–15.6)
SODIUM SERPL-SCNC: 139 MMOL/L — SIGNIFICANT CHANGE UP (ref 135–145)
TACROLIMUS SERPL-MCNC: 8 NG/ML — SIGNIFICANT CHANGE UP
WBC # BLD: 10.2 K/UL — SIGNIFICANT CHANGE UP (ref 4.8–10.8)
WBC # FLD AUTO: 10.2 K/UL — SIGNIFICANT CHANGE UP (ref 4.8–10.8)

## 2017-08-21 PROCEDURE — 99233 SBSQ HOSP IP/OBS HIGH 50: CPT

## 2017-08-21 PROCEDURE — 76770 US EXAM ABDO BACK WALL COMP: CPT | Mod: 26

## 2017-08-21 RX ORDER — SODIUM CHLORIDE 9 MG/ML
1000 INJECTION, SOLUTION INTRAVENOUS
Qty: 0 | Refills: 0 | Status: DISCONTINUED | OUTPATIENT
Start: 2017-08-21 | End: 2017-08-22

## 2017-08-21 RX ORDER — MYCOPHENOLIC ACID 180 MG/1
540 TABLET, DELAYED RELEASE ORAL
Qty: 0 | Refills: 0 | Status: DISCONTINUED | OUTPATIENT
Start: 2017-08-21 | End: 2017-08-23

## 2017-08-21 RX ADMIN — LINEZOLID 300 MILLIGRAM(S): 600 INJECTION, SOLUTION INTRAVENOUS at 17:28

## 2017-08-21 RX ADMIN — LINEZOLID 300 MILLIGRAM(S): 600 INJECTION, SOLUTION INTRAVENOUS at 06:30

## 2017-08-21 RX ADMIN — ENOXAPARIN SODIUM 40 MILLIGRAM(S): 100 INJECTION SUBCUTANEOUS at 12:50

## 2017-08-21 RX ADMIN — MYCOPHENOLIC ACID 540 MILLIGRAM(S): 180 TABLET, DELAYED RELEASE ORAL at 17:28

## 2017-08-21 RX ADMIN — SODIUM CHLORIDE 100 MILLILITER(S): 9 INJECTION, SOLUTION INTRAVENOUS at 22:06

## 2017-08-21 RX ADMIN — SODIUM CHLORIDE 150 MILLILITER(S): 9 INJECTION INTRAMUSCULAR; INTRAVENOUS; SUBCUTANEOUS at 06:30

## 2017-08-21 RX ADMIN — Medication 5 MILLIGRAM(S): at 06:30

## 2017-08-21 RX ADMIN — SODIUM CHLORIDE 100 MILLILITER(S): 9 INJECTION, SOLUTION INTRAVENOUS at 10:01

## 2017-08-21 RX ADMIN — ERTAPENEM SODIUM 120 MILLIGRAM(S): 1 INJECTION, POWDER, LYOPHILIZED, FOR SOLUTION INTRAMUSCULAR; INTRAVENOUS at 12:50

## 2017-08-21 RX ADMIN — TACROLIMUS 2 MILLIGRAM(S): 5 CAPSULE ORAL at 06:30

## 2017-08-21 RX ADMIN — Medication 650 MILLIGRAM(S): at 18:09

## 2017-08-21 RX ADMIN — ONDANSETRON 4 MILLIGRAM(S): 8 TABLET, FILM COATED ORAL at 23:36

## 2017-08-21 RX ADMIN — SODIUM CHLORIDE 100 MILLILITER(S): 9 INJECTION, SOLUTION INTRAVENOUS at 17:08

## 2017-08-21 RX ADMIN — TACROLIMUS 2 MILLIGRAM(S): 5 CAPSULE ORAL at 17:28

## 2017-08-21 NOTE — CONSULT NOTE ADULT - SUBJECTIVE AND OBJECTIVE BOX
HPI:  patient is a 26 yr old female with very significant medical history of Liver transplant at the age of 13 and renal transplant appx 6 months go in 12/2016. now for a week was feeling weak and  and was asked to hold her doxycycline and her nitrofurantoin but after she her  meds for almost a week she felt worse and so started her nitrofurantoin yesterday and came in to hospital today when she didn't feel better . No reliving or exacerbating conditions, symptoms severe in intensity (20 Aug 2017 11:40).Temp at home 102 with bladder symptoms. No hematuria.      PAST MEDICAL & SURGICAL HISTORY:  Liver transplant recipient due to tylenol OD  Kidney transplant recipient due to transplant meds  No significant past surgical history      HEALTH ISSUES - PROBLEM Dx:  Kidney transplant recipient: Kidney transplant recipient  Liver transplant recipient: Liver transplant recipient  Immunosuppression: Immunosuppression  Sepsis, due to unspecified organism: Sepsis, due to unspecified organism  Febrile illness, acute: Febrile illness, acute          Allergies    iv contrast (Anaphylaxis)  Motrin (Other)            FAMILY HISTORY:  No pertinent family history in first degree relatives      MEDICATIONS  (STANDING):  ertapenem  IVPB   IV Intermittent   enoxaparin Injectable 40 milliGRAM(s) SubCutaneous daily  ertapenem  IVPB 1000 milliGRAM(s) IV Intermittent every 24 hours  tacrolimus 2 milliGRAM(s) Oral two times a day  predniSONE   Tablet 5 milliGRAM(s) Oral daily  linezolid  IVPB   IV Intermittent   linezolid  IVPB 600 milliGRAM(s) IV Intermittent every 12 hours  mycophenolic Acid 540 milliGRAM(s) Oral two times a day    MEDICATIONS  (PRN):  acetaminophen   Tablet 650 milliGRAM(s) Oral every 6 hours PRN For Temp greater than 38 C (100.4 F)  ondansetron Injectable 4 milliGRAM(s) IV Push every 8 hours PRN Nausea and/or Vomiting      ICU Vital Signs Last 24 Hrs  T(C): 36.5 (21 Aug 2017 02:38), Max: 38.6 (20 Aug 2017 20:13)  T(F): 97.7 (21 Aug 2017 02:38), Max: 101.5 (20 Aug 2017 20:13)  HR: 88 (20 Aug 2017 23:40) (88 - 123)  BP: 105/58 (21 Aug 2017 02:38) (105/58 - 135/84)  BP(mean): --  ABP: --  ABP(mean): --  RR: 17 (21 Aug 2017 02:38) (17 - 20)  SpO2: 100% (21 Aug 2017 02:38) (96% - 100%)      I&O's Summary                            12.6   10.2  )-----------( 241      ( 21 Aug 2017 05:54 )             39.5       08-21    139  |  105  |  5.0<L>  ----------------------------<  102  4.4   |  20.0<L>  |  0.62    Ca    8.8      21 Aug 2017 05:54  Mg     1.5     08-20    TPro  6.7  /  Alb  3.3  /  TBili  0.3<L>  /  DBili  x   /  AST  19  /  ALT  29  /  AlkPhos  65  08-21      PHYSICAL EXAM:      Constitutional: acutely ill    Eyes: wnl    ENMT: wnl    Neck: veins  flat    Breasts: wnl    Back: wnl    Respiratory: lungs clear    Cardiovascular: 1/6 systolic murmur, reg. rate    Gastrointestinal: not tender, bs  noted, no bruit    Genitourinary: mild bladder area tenderness    Rectal: deferred    Extremities: no edema    Vascular: wnl    Neurological: wnl    Skin: large abdominal tatoo    Lymph Nodes: neg    Musculoskeletal: wnl    Psychiatric: wnl

## 2017-08-21 NOTE — PROGRESS NOTE ADULT - SUBJECTIVE AND OBJECTIVE BOX
ATTILA GARCIA Patient is a 26y old  Female who presents with a chief complaint of feeling week and Dizzy (20 Aug 2017 11:40)     HPI:  patient is a 26 yr old female with very significant medical history of Liver transplant at the age of 13 and renal transplant appx 6 months go in 2016. now for a week was feeling week and  and was asked to hold her doxycycline and her nitrofurantoin but after she her her meds for almost a week she felt worse and so started her nitrofurantoin yesterday and came in to hospital today when she didn't feel better . No reliving or exacerbating conditions, symptoms severe in intensity (20 Aug 2017 11:40)    The patient was seen and evaluated recent transplant , UTI  The patient is in no acute distress.  Denied any fever chest pain, palpitations, shortness of breath, abdominal pain, fever, dysuria, cough, edema   Complains of fells much better     I&O's Summary    Allergies    iv contrast (Anaphylaxis)  Motrin (Other)    Intolerances      HEALTH ISSUES - PROBLEM Dx:  Kidney transplant recipient: Kidney transplant recipient  Liver transplant recipient: Liver transplant recipient  Immunosuppression: Immunosuppression  Sepsis, due to unspecified organism: Sepsis, due to unspecified organism  Febrile illness, acute: Febrile illness, acute        PAST MEDICAL & SURGICAL HISTORY:  Liver transplant recipient  Kidney transplant recipient  No significant past surgical history          Vital Signs Last 24 Hrs  T(C): 36.7 (21 Aug 2017 13:00), Max: 38.6 (20 Aug 2017 20:13)  T(F): 98.1 (21 Aug 2017 13:00), Max: 101.5 (20 Aug 2017 20:13)  HR: 71 (21 Aug 2017 13:00) (71 - 120)  BP: 117/62 (21 Aug 2017 13:00) (105/58 - 131/71)  BP(mean): --  RR: 16 (21 Aug 2017 13:00) (16 - 18)  SpO2: 100% (21 Aug 2017 13:00) (99% - 100%)T(C): 36.7 (21 Aug 2017 13:00), Max: 38.6 (20 Aug 2017 20:13)  HR: 71 (21 Aug 2017 13:00) (71 - 120)  BP: 117/62 (21 Aug 2017 13:00) (105/58 - 131/71)  RR: 16 (21 Aug 2017 13:00) (16 - 18)  SpO2: 100% (21 Aug 2017 13:00) (99% - 100%)  Wt(kg): --    PHYSICAL EXAM:    GENERAL: NAD, well-groomed, well-developed  HEAD:  Atraumatic, Normocephalic  EYES: EOMI, PERRLA, conjunctiva and sclera clear  ENMT:  Moist mucous membranes,  No lesions  NECK: Supple, No JVD, Normal thyroid  NERVOUS SYSTEM:  Alert & Oriented X3,  Moves upper and lower extremities; CNS-II-XII  CHEST/LUNG: Clear to auscultation bilaterally; No rales, rhonchi, wheezing,   HEART: Regular rate and rhythm; No murmurs,   ABDOMEN: Soft, Nontender, Nondistended; Bowel sounds present  EXTREMITIES:  Peripheral Pulses, No  cyanosis, or edema  SKIN: No rashes or lesions  psychiatry- mood and affect approprite, Insight and judgement intact     ertapenem  IVPB   IV Intermittent   enoxaparin Injectable 40 milliGRAM(s) SubCutaneous daily  ertapenem  IVPB 1000 milliGRAM(s) IV Intermittent every 24 hours  tacrolimus 2 milliGRAM(s) Oral two times a day  predniSONE   Tablet 5 milliGRAM(s) Oral daily  linezolid  IVPB   IV Intermittent   linezolid  IVPB 600 milliGRAM(s) IV Intermittent every 12 hours  acetaminophen   Tablet 650 milliGRAM(s) Oral every 6 hours PRN  ondansetron Injectable 4 milliGRAM(s) IV Push every 8 hours PRN  mycophenolic Acid 540 milliGRAM(s) Oral two times a day  sodium chloride 0.45% 1000 milliLiter(s) IV Continuous <Continuous>      LABS:                          12.6   10.2  )-----------( 241      ( 21 Aug 2017 05:54 )             39.5     08-21    139  |  105  |  5.0<L>  ----------------------------<  102  4.4   |  20.0<L>  |  0.62    Ca    8.8      21 Aug 2017 05:54  Mg     1.5     08-    TPro  6.7  /  Alb  3.3  /  TBili  0.3<L>  /  DBili  x   /  AST  19  /  ALT  29  /  AlkPhos  65  08-    LIVER FUNCTIONS - ( 21 Aug 2017 05:54 )  Alb: 3.3 g/dL / Pro: 6.7 g/dL / ALK PHOS: 65 U/L / ALT: 29 U/L / AST: 19 U/L / GGT: x                 Urinalysis Basic - ( 20 Aug 2017 06:00 )    Color: Yellow / Appearance: x / S.005 / pH: x  Gluc: x / Ketone: Trace  / Bili: Negative / Urobili: Negative mg/dL   Blood: x / Protein: Negative mg/dL / Nitrite: Negative   Leuk Esterase: Trace / RBC: 3-5 /HPF / WBC 6-10   Sq Epi: x / Non Sq Epi: x / Bacteria: Few      CAPILLARY BLOOD GLUCOSE          RADIOLOGY & ADDITIONAL TESTS:      Consultant notes reviewed    Case discussed with consultant/provider/ family /patient

## 2017-08-21 NOTE — ED ADULT NURSE REASSESSMENT NOTE - NS ED NURSE REASSESS COMMENT FT1
Assuming care from previous RN, pt AOx4, denies SOB, resp even and unlabored, LS clear and equal bilaterally, skin warm and dry, color good, patent 22G IV in left AC and 20G IV in right AC, showing NSR on monitor, denies pain/n/v at this time, pt has bed assigned will call report at 2000, pt aware of plan of care, will continue to monitor.
Dr Martinez at bedside   meds changed as per pts request..
Pt moved to ambulance triage, code sepsis activated code 1 at bedside
Report given to receiving RN Celeste, preparing pt for transport to floor, pt aware of plan of care.
assumed care of pt  pt resting comfortaly   IVSL bilateral AC's +flush +blood return sites clean/dry  no complaints.  up to brp ad joel, gait steady.. CM shows NSR     OCRLEY=strength   waiting bed avail.... IVF NS infusing via pump as ordered,  earlier ABT infusing as ordered,  NPO   waiting bed avail  needs anticipated
pt sleeping comfortably, no complaints.
pt resting comfortable.
see trauma flow sheet.
Patient A&OX3. Denies any pain or discomfort at this time. VSS. CM in place. tachy on monitor. safety Maintained.
Patient A&OX3. c/o abd pain and nausea at this time. Pain meds given as per Md ordered. CM in place. tachy on monitor. MD aware.
Patient A&OX3. c/o abd pain and nausea. Dr Archer aware. waiting for new order. safety maintained.
pt noted with fever and pain, MD called. pt given medication. pt explained plan of care. will continue to monitor.
Patient received at 0730; awake; alert and oriented x4. c/o abd pain. Denies SOB, dizziness. No distress noted. VSS. Respirations unlabored. Report received at bedside. Cardiac monitor in place. tachy on monitor. Dr Alegria made aware. Call manzanares and personal items in reach. Continue to monitor patient and maintain safety.

## 2017-08-22 LAB
ALBUMIN SERPL ELPH-MCNC: 3 G/DL — LOW (ref 3.3–5.2)
ALP SERPL-CCNC: 53 U/L — SIGNIFICANT CHANGE UP (ref 40–120)
ALT FLD-CCNC: 19 U/L — SIGNIFICANT CHANGE UP
ANION GAP SERPL CALC-SCNC: 12 MMOL/L — SIGNIFICANT CHANGE UP (ref 5–17)
APPEARANCE UR: CLEAR — SIGNIFICANT CHANGE UP
AST SERPL-CCNC: 12 U/L — SIGNIFICANT CHANGE UP
BILIRUB SERPL-MCNC: <0.2 MG/DL — LOW (ref 0.4–2)
BILIRUB UR-MCNC: NEGATIVE — SIGNIFICANT CHANGE UP
BUN SERPL-MCNC: 11 MG/DL — SIGNIFICANT CHANGE UP (ref 8–20)
CALCIUM SERPL-MCNC: 8.1 MG/DL — LOW (ref 8.6–10.2)
CHLORIDE SERPL-SCNC: 100 MMOL/L — SIGNIFICANT CHANGE UP (ref 98–107)
CO2 SERPL-SCNC: 22 MMOL/L — SIGNIFICANT CHANGE UP (ref 22–29)
COLOR SPEC: YELLOW — SIGNIFICANT CHANGE UP
CREAT SERPL-MCNC: 0.69 MG/DL — SIGNIFICANT CHANGE UP (ref 0.5–1.3)
CULTURE RESULTS: NO GROWTH — SIGNIFICANT CHANGE UP
DIFF PNL FLD: NEGATIVE — SIGNIFICANT CHANGE UP
GLUCOSE SERPL-MCNC: 132 MG/DL — HIGH (ref 70–115)
GLUCOSE UR QL: NEGATIVE MG/DL — SIGNIFICANT CHANGE UP
KETONES UR-MCNC: ABNORMAL
LEUKOCYTE ESTERASE UR-ACNC: NEGATIVE — SIGNIFICANT CHANGE UP
NITRITE UR-MCNC: NEGATIVE — SIGNIFICANT CHANGE UP
PH UR: 8 — SIGNIFICANT CHANGE UP (ref 5–8)
POTASSIUM SERPL-MCNC: 3.6 MMOL/L — SIGNIFICANT CHANGE UP (ref 3.5–5.3)
POTASSIUM SERPL-SCNC: 3.6 MMOL/L — SIGNIFICANT CHANGE UP (ref 3.5–5.3)
PROT SERPL-MCNC: 6 G/DL — LOW (ref 6.6–8.7)
PROT UR-MCNC: NEGATIVE MG/DL — SIGNIFICANT CHANGE UP
SODIUM SERPL-SCNC: 134 MMOL/L — LOW (ref 135–145)
SP GR SPEC: 1.01 — SIGNIFICANT CHANGE UP (ref 1.01–1.02)
SPECIMEN SOURCE: SIGNIFICANT CHANGE UP
TACROLIMUS SERPL-MCNC: 8.3 NG/ML — SIGNIFICANT CHANGE UP
UROBILINOGEN FLD QL: NEGATIVE MG/DL — SIGNIFICANT CHANGE UP

## 2017-08-22 PROCEDURE — 99233 SBSQ HOSP IP/OBS HIGH 50: CPT

## 2017-08-22 RX ORDER — MAGNESIUM OXIDE 400 MG ORAL TABLET 241.3 MG
400 TABLET ORAL
Qty: 0 | Refills: 0 | Status: DISCONTINUED | OUTPATIENT
Start: 2017-08-22 | End: 2017-08-23

## 2017-08-22 RX ORDER — HYDROMORPHONE HYDROCHLORIDE 2 MG/ML
1 INJECTION INTRAMUSCULAR; INTRAVENOUS; SUBCUTANEOUS ONCE
Qty: 0 | Refills: 0 | Status: DISCONTINUED | OUTPATIENT
Start: 2017-08-22 | End: 2017-08-22

## 2017-08-22 RX ORDER — DEXTROSE MONOHYDRATE, SODIUM CHLORIDE, AND POTASSIUM CHLORIDE 50; .745; 4.5 G/1000ML; G/1000ML; G/1000ML
1000 INJECTION, SOLUTION INTRAVENOUS
Qty: 0 | Refills: 0 | Status: DISCONTINUED | OUTPATIENT
Start: 2017-08-22 | End: 2017-08-23

## 2017-08-22 RX ADMIN — DEXTROSE MONOHYDRATE, SODIUM CHLORIDE, AND POTASSIUM CHLORIDE 50 MILLILITER(S): 50; .745; 4.5 INJECTION, SOLUTION INTRAVENOUS at 09:24

## 2017-08-22 RX ADMIN — HYDROMORPHONE HYDROCHLORIDE 1 MILLIGRAM(S): 2 INJECTION INTRAMUSCULAR; INTRAVENOUS; SUBCUTANEOUS at 02:51

## 2017-08-22 RX ADMIN — LINEZOLID 300 MILLIGRAM(S): 600 INJECTION, SOLUTION INTRAVENOUS at 18:11

## 2017-08-22 RX ADMIN — LINEZOLID 300 MILLIGRAM(S): 600 INJECTION, SOLUTION INTRAVENOUS at 05:40

## 2017-08-22 RX ADMIN — TACROLIMUS 2 MILLIGRAM(S): 5 CAPSULE ORAL at 18:11

## 2017-08-22 RX ADMIN — Medication 5 MILLIGRAM(S): at 05:40

## 2017-08-22 RX ADMIN — TACROLIMUS 2 MILLIGRAM(S): 5 CAPSULE ORAL at 06:35

## 2017-08-22 RX ADMIN — MYCOPHENOLIC ACID 540 MILLIGRAM(S): 180 TABLET, DELAYED RELEASE ORAL at 06:35

## 2017-08-22 RX ADMIN — MAGNESIUM OXIDE 400 MG ORAL TABLET 400 MILLIGRAM(S): 241.3 TABLET ORAL at 18:11

## 2017-08-22 RX ADMIN — MAGNESIUM OXIDE 400 MG ORAL TABLET 400 MILLIGRAM(S): 241.3 TABLET ORAL at 09:24

## 2017-08-22 RX ADMIN — MAGNESIUM OXIDE 400 MG ORAL TABLET 400 MILLIGRAM(S): 241.3 TABLET ORAL at 11:49

## 2017-08-22 RX ADMIN — MYCOPHENOLIC ACID 540 MILLIGRAM(S): 180 TABLET, DELAYED RELEASE ORAL at 18:12

## 2017-08-22 RX ADMIN — ERTAPENEM SODIUM 120 MILLIGRAM(S): 1 INJECTION, POWDER, LYOPHILIZED, FOR SOLUTION INTRAMUSCULAR; INTRAVENOUS at 10:52

## 2017-08-22 RX ADMIN — HYDROMORPHONE HYDROCHLORIDE 1 MILLIGRAM(S): 2 INJECTION INTRAMUSCULAR; INTRAVENOUS; SUBCUTANEOUS at 03:06

## 2017-08-22 NOTE — PROGRESS NOTE ADULT - SUBJECTIVE AND OBJECTIVE BOX
NEPHROLOGY INTERVAL HPI/OVERNIGHT EVENTS:    No new events.    MEDICATIONS  (STANDING):  ertapenem  IVPB   IV Intermittent   enoxaparin Injectable 40 milliGRAM(s) SubCutaneous daily  ertapenem  IVPB 1000 milliGRAM(s) IV Intermittent every 24 hours  tacrolimus 2 milliGRAM(s) Oral two times a day  predniSONE   Tablet 5 milliGRAM(s) Oral daily  linezolid  IVPB   IV Intermittent   linezolid  IVPB 600 milliGRAM(s) IV Intermittent every 12 hours  mycophenolic Acid 540 milliGRAM(s) Oral two times a day  sodium chloride 0.45% 1000 milliLiter(s) (100 mL/Hr) IV Continuous <Continuous>    MEDICATIONS  (PRN):  acetaminophen   Tablet 650 milliGRAM(s) Oral every 6 hours PRN For Temp greater than 38 C (100.4 F)  ondansetron Injectable 4 milliGRAM(s) IV Push every 8 hours PRN Nausea and/or Vomiting      Allergies    iv contrast (Anaphylaxis)  Motrin (Other)    Intolerances        Vital Signs Last 24 Hrs  T(C): 36.8 (22 Aug 2017 04:34), Max: 37.2 (21 Aug 2017 19:15)  T(F): 98.2 (22 Aug 2017 04:34), Max: 99 (21 Aug 2017 19:15)  HR: 80 (22 Aug 2017 04:34) (71 - 84)  BP: 118/78 (22 Aug 2017 04:34) (112/70 - 118/78)  BP(mean): --  RR: 18 (22 Aug 2017 04:34) (16 - 18)  SpO2: 100% (22 Aug 2017 04:34) (100% - 100%)  Daily     Daily     PHYSICAL EXAM:    GENERAL: Oriented in bed without complaint  HEAD:  wnl  EYES:   ENMT:   NECK: wnl  NERVOUS SYSTEM:  wnl  CHEST/LUNG: clear  HEART: no gallop  ABDOMEN: soft, not tender  EXTREMITIES:  no edema  LYMPH:   SKIN: tatoo abdomin same   neg  LABS:                        12.6   10.2  )-----------( 241      ( 21 Aug 2017 05:54 )             39.5     08-22    134<L>  |  100  |  11.0  ----------------------------<  132<H>  3.6   |  22.0  |  0.69    Ca    8.1<L>      22 Aug 2017 06:40    TPro  6.0<L>  /  Alb  3.0<L>  /  TBili  <0.2<L>  /  DBili  x   /  AST  12  /  ALT  19  /  AlkPhos  53  08-22                RADIOLOGY & ADDITIONAL TESTS:

## 2017-08-22 NOTE — PROGRESS NOTE ADULT - SUBJECTIVE AND OBJECTIVE BOX
[facility]  INFECTIOUS DISEASES AND INTERNAL MEDICINE OF [place]  =======================================================  Anton Spencer MD [facility]   Jose Horan MD  Diplomates American Board of Internal Medicine and Infectious Diseases  =======================================================    GARCIADANISHAHOLGER [id number]  chief complaint: WBC elevation, fever,  possible UTI    patient seen and examined in follow up.  Chart and labs reviewed.   Blood and urine cultures in process.     urine culture  - prelim with no significant growth (lab called)  Ultrasound of kidneys reviewed.  No perinephric collection, no hydronephrosis.    =======================================================  Allergies:  iv contrast (Anaphylaxis)  Motrin (Other)      =======================================================  Medications:  ertapenem  IVPB   IV Intermittent   enoxaparin Injectable 40 milliGRAM(s) SubCutaneous daily  ertapenem  IVPB 1000 milliGRAM(s) IV Intermittent every 24 hours  tacrolimus 2 milliGRAM(s) Oral two times a day  predniSONE   Tablet 5 milliGRAM(s) Oral daily  linezolid  IVPB   IV Intermittent   linezolid  IVPB 600 milliGRAM(s) IV Intermittent every 12 hours  acetaminophen   Tablet 650 milliGRAM(s) Oral every 6 hours PRN  ondansetron Injectable 4 milliGRAM(s) IV Push every 8 hours PRN  mycophenolic Acid 540 milliGRAM(s) Oral two times a day  sodium chloride 0.45% 1000 milliLiter(s) IV Continuous <Continuous>       =======================================================     REVIEW OF SYSTEMS:  CONSTITUTIONAL:  No Fever or chills  HEENT:   No diplopia or blurred vision.  No earache, sore throat or runny nose.  CARDIOVASCULAR:  No pressure, squeezing, strangling, tightness, heaviness or aching about the chest, neck, axilla or epigastrium.  RESPIRATORY:  No cough, shortness of breath, PND or orthopnea.  GASTROINTESTINAL:  No nausea, vomiting or diarrhea.  GENITOURINARY:  No dysuria, frequency or urgency. No Blood in urine  MUSCULOSKELETAL:  no joint aches, no muscle pain  SKIN:  No change in skin, hair or nails.  NEUROLOGIC:  No paresthesias, fasciculations, seizures or weakness.  PSYCHIATRIC:  No disorder of thought or mood.  ENDOCRINE:  No heat or cold intolerance, polyuria or polydipsia.  HEMATOLOGICAL:  No easy bruising or bleeding.     =======================================================     Physical Exam:  ICU Vital Signs Last 24 Hrs  T(C): 36.8 (22 Aug 2017 04:34), Max: 37.2 (21 Aug 2017 19:15)  T(F): 98.2 (22 Aug 2017 04:34), Max: 99 (21 Aug 2017 19:15)  HR: 80 (22 Aug 2017 04:34) (71 - 84)  BP: 118/78 (22 Aug 2017 04:34) (112/70 - 118/78)  RR: 18 (22 Aug 2017 04:34) (16 - 18)  SpO2: 100% (22 Aug 2017 04:34) (100% - 100%)    GEN: NAD, pleasant  HEENT: normocephalic and atraumatic. EOMI. VAIBHAV.    NECK: Supple. No carotid bruits.  No lymphadenopathy or thyromegaly.  LUNGS: Clear to auscultation.  HEART: Regular rate and rhythm without murmur.  ABDOMEN: Soft, nontender, and nondistended.  Positive bowel sounds.    : No CVA tenderness  EXTREMITIES: Without any cyanosis, clubbing, rash, lesions or edema.  MSK: no joint swelling  NEUROLOGIC: Cranial nerves II through XII are grossly intact.  PSYCHIATRIC: Appropriate affect .  SKIN: No ulceration or induration present.    =======================================================    Labs:  08-22    134<L>  |  100  |  11.0  ----------------------------<  132<H>  3.6   |  22.0  |  0.69    Ca    8.1<L>      22 Aug 2017 06:40    TPro  6.0<L>  /  Alb  3.0<L>  /  TBili  <0.2<L>  /  DBili  x   /  AST  12  /  ALT  19  /  AlkPhos  53  08-22                          12.6   10.2  )-----------( 241      ( 21 Aug 2017 05:54 )             39.5     LIVER FUNCTIONS - ( 22 Aug 2017 06:40 )  Alb: 3.0 g/dL / Pro: 6.0 g/dL / ALK PHOS: 53 U/L / ALT: 19 U/L / AST: 12 U/L / GGT: x             ====================   EXAM:  US KIDNEY TRANSPLANT W DOPP BI                          PROCEDURE DATE:  08/21/2017         INTERPRETATION:  History:Renal transplant. Fever.    Technique: Ultrasound of the native kidneys and renal transplant in the   right lower quadrant    Comparison: None     Findings: The native kidneys are atrophic, right measuring 4.3 and the   left measuring 5.2 cm    There is a transplant in the right lower quadrant measuring 13.3 cm.   Subjectively, the transplant appears perfused. There is no   hydronephrosis. No hypoechoic or hyperechoic mass. No perinephric   collection    Impression:     No hydronephrosis. No perinephric collection.         JO ANN BERNAL M.D., ATTENDING RADIOLOGIST  This document has been electronically signed. Aug 21 2017 12:24PM [facility]  INFECTIOUS DISEASES AND INTERNAL MEDICINE OF [place]  =======================================================  Anton Spencer MD [facility]   Jose Horan MD  Diplomates American Board of Internal Medicine and Infectious Diseases  =======================================================    ATTILA GARCIA [id number]  chief complaint: WBC elevation, fever,  possible UTI    patient seen and examined in follow up.  Chart and labs reviewed.   Blood and urine cultures in process.     urine culture  - prelim with no significant growth (lab called)  Ultrasound of kidneys reviewed.  No perinephric collection, no hydronephrosis.  patient reports some bladder spasms, which often occurs when she has UTIs.     =======================================================  Allergies:  iv contrast (Anaphylaxis)  Motrin (Other)      =======================================================  Medications:  ertapenem  IVPB   IV Intermittent   enoxaparin Injectable 40 milliGRAM(s) SubCutaneous daily  ertapenem  IVPB 1000 milliGRAM(s) IV Intermittent every 24 hours  tacrolimus 2 milliGRAM(s) Oral two times a day  predniSONE   Tablet 5 milliGRAM(s) Oral daily  linezolid  IVPB   IV Intermittent   linezolid  IVPB 600 milliGRAM(s) IV Intermittent every 12 hours  acetaminophen   Tablet 650 milliGRAM(s) Oral every 6 hours PRN  ondansetron Injectable 4 milliGRAM(s) IV Push every 8 hours PRN  mycophenolic Acid 540 milliGRAM(s) Oral two times a day  sodium chloride 0.45% 1000 milliLiter(s) IV Continuous <Continuous>       =======================================================     REVIEW OF SYSTEMS:  CONSTITUTIONAL:  No Fever or chills  HEENT:   No diplopia or blurred vision.  No earache, sore throat or runny nose.  CARDIOVASCULAR:  No pressure, squeezing, strangling, tightness, heaviness or aching about the chest, neck, axilla or epigastrium.  RESPIRATORY:  No cough, shortness of breath, PND or orthopnea.  GASTROINTESTINAL:  No nausea, vomiting or diarrhea. RESOLVED ABD PAIN.   GENITOURINARY:  No dysuria, frequency or urgency. No Blood in urine  MUSCULOSKELETAL:  no joint aches, no muscle pain  SKIN:  No change in skin, hair or nails.  NEUROLOGIC:  No paresthesias, fasciculations, seizures or weakness.  PSYCHIATRIC:  No disorder of thought or mood.  ENDOCRINE:  No heat or cold intolerance, polyuria or polydipsia.  HEMATOLOGICAL:  No easy bruising or bleeding.     =======================================================     Physical Exam:  ICU Vital Signs Last 24 Hrs  T(C): 36.8 (22 Aug 2017 04:34), Max: 37.2 (21 Aug 2017 19:15)  T(F): 98.2 (22 Aug 2017 04:34), Max: 99 (21 Aug 2017 19:15)  HR: 80 (22 Aug 2017 04:34) (71 - 84)  BP: 118/78 (22 Aug 2017 04:34) (112/70 - 118/78)  RR: 18 (22 Aug 2017 04:34) (16 - 18)  SpO2: 100% (22 Aug 2017 04:34) (100% - 100%)    GEN: NAD, pleasant  HEENT: normocephalic and atraumatic. EOMI. VAIBHAV.    NECK: Supple. No carotid bruits.  No lymphadenopathy or thyromegaly.  LUNGS: Clear to auscultation.  HEART: Regular rate and rhythm without murmur.  ABDOMEN: Soft, nontender, and nondistended.  Positive bowel sounds.  NO TENDERNESS AT LOWER RIGHT ABD AT TRANSPLANT SITE  : No CVA tenderness  EXTREMITIES: Without any cyanosis, clubbing, rash, lesions or edema.  MSK: no joint swelling  NEUROLOGIC: Cranial nerves II through XII are grossly intact.  PSYCHIATRIC: Appropriate affect .  SKIN: No ulceration or induration present.    =======================================================    Labs:  08-22    134<L>  |  100  |  11.0  ----------------------------<  132<H>  3.6   |  22.0  |  0.69    Ca    8.1<L>      22 Aug 2017 06:40    TPro  6.0<L>  /  Alb  3.0<L>  /  TBili  <0.2<L>  /  DBili  x   /  AST  12  /  ALT  19  /  AlkPhos  53  08-22                          12.6   10.2  )-----------( 241      ( 21 Aug 2017 05:54 )             39.5     LIVER FUNCTIONS - ( 22 Aug 2017 06:40 )  Alb: 3.0 g/dL / Pro: 6.0 g/dL / ALK PHOS: 53 U/L / ALT: 19 U/L / AST: 12 U/L / GGT: x             ====================   EXAM:  US KIDNEY TRANSPLANT W DOPP BI                          PROCEDURE DATE:  08/21/2017         INTERPRETATION:  History:Renal transplant. Fever.    Technique: Ultrasound of the native kidneys and renal transplant in the   right lower quadrant    Comparison: None     Findings: The native kidneys are atrophic, right measuring 4.3 and the   left measuring 5.2 cm    There is a transplant in the right lower quadrant measuring 13.3 cm.   Subjectively, the transplant appears perfused. There is no   hydronephrosis. No hypoechoic or hyperechoic mass. No perinephric   collection    Impression:     No hydronephrosis. No perinephric collection.         JO ANN BERNAL M.D., ATTENDING RADIOLOGIST  This document has been electronically signed. Aug 21 2017 12:24PM

## 2017-08-23 ENCOUNTER — TRANSCRIPTION ENCOUNTER (OUTPATIENT)
Age: 27
End: 2017-08-23

## 2017-08-23 VITALS
WEIGHT: 152.78 LBS | TEMPERATURE: 98 F | SYSTOLIC BLOOD PRESSURE: 117 MMHG | HEART RATE: 81 BPM | OXYGEN SATURATION: 99 % | DIASTOLIC BLOOD PRESSURE: 77 MMHG | RESPIRATION RATE: 18 BRPM

## 2017-08-23 LAB
ALBUMIN SERPL ELPH-MCNC: 3.7 G/DL — SIGNIFICANT CHANGE UP (ref 3.3–5.2)
ALP SERPL-CCNC: 64 U/L — SIGNIFICANT CHANGE UP (ref 40–120)
ALT FLD-CCNC: 17 U/L — SIGNIFICANT CHANGE UP
ANION GAP SERPL CALC-SCNC: 13 MMOL/L — SIGNIFICANT CHANGE UP (ref 5–17)
AST SERPL-CCNC: 11 U/L — SIGNIFICANT CHANGE UP
BILIRUB SERPL-MCNC: 0.2 MG/DL — LOW (ref 0.4–2)
BUN SERPL-MCNC: 9 MG/DL — SIGNIFICANT CHANGE UP (ref 8–20)
CALCIUM SERPL-MCNC: 8.7 MG/DL — SIGNIFICANT CHANGE UP (ref 8.6–10.2)
CHLORIDE SERPL-SCNC: 104 MMOL/L — SIGNIFICANT CHANGE UP (ref 98–107)
CO2 SERPL-SCNC: 21 MMOL/L — LOW (ref 22–29)
CREAT SERPL-MCNC: 0.85 MG/DL — SIGNIFICANT CHANGE UP (ref 0.5–1.3)
GLUCOSE SERPL-MCNC: 133 MG/DL — HIGH (ref 70–115)
HCT VFR BLD CALC: 38.8 % — SIGNIFICANT CHANGE UP (ref 37–47)
HGB BLD-MCNC: 12.4 G/DL — SIGNIFICANT CHANGE UP (ref 12–16)
MAGNESIUM SERPL-MCNC: 1.7 MG/DL — SIGNIFICANT CHANGE UP (ref 1.6–2.6)
MCHC RBC-ENTMCNC: 27.9 PG — SIGNIFICANT CHANGE UP (ref 27–31)
MCHC RBC-ENTMCNC: 32 G/DL — SIGNIFICANT CHANGE UP (ref 32–36)
MCV RBC AUTO: 87.2 FL — SIGNIFICANT CHANGE UP (ref 81–99)
MYCOPHENOLATE SERPL-MCNC: ABNORMAL
PLATELET # BLD AUTO: 299 K/UL — SIGNIFICANT CHANGE UP (ref 150–400)
POTASSIUM SERPL-MCNC: 4.1 MMOL/L — SIGNIFICANT CHANGE UP (ref 3.5–5.3)
POTASSIUM SERPL-SCNC: 4.1 MMOL/L — SIGNIFICANT CHANGE UP (ref 3.5–5.3)
PROT SERPL-MCNC: 6.9 G/DL — SIGNIFICANT CHANGE UP (ref 6.6–8.7)
RBC # BLD: 4.45 M/UL — SIGNIFICANT CHANGE UP (ref 4.4–5.2)
RBC # FLD: 13.6 % — SIGNIFICANT CHANGE UP (ref 11–15.6)
SODIUM SERPL-SCNC: 138 MMOL/L — SIGNIFICANT CHANGE UP (ref 135–145)
WBC # BLD: 6.5 K/UL — SIGNIFICANT CHANGE UP (ref 4.8–10.8)
WBC # FLD AUTO: 6.5 K/UL — SIGNIFICANT CHANGE UP (ref 4.8–10.8)

## 2017-08-23 PROCEDURE — 99232 SBSQ HOSP IP/OBS MODERATE 35: CPT

## 2017-08-23 PROCEDURE — 99239 HOSP IP/OBS DSCHRG MGMT >30: CPT

## 2017-08-23 RX ORDER — TACROLIMUS 5 MG/1
0 CAPSULE ORAL
Qty: 0 | Refills: 0 | COMMUNITY

## 2017-08-23 RX ORDER — PREDNISOLONE 5 MG
0 TABLET ORAL
Qty: 0 | Refills: 0 | COMMUNITY

## 2017-08-23 RX ORDER — MYCOPHENOLIC ACID 180 MG/1
0 TABLET, DELAYED RELEASE ORAL
Qty: 0 | Refills: 0 | COMMUNITY

## 2017-08-23 RX ORDER — TACROLIMUS 5 MG/1
2 CAPSULE ORAL
Qty: 0 | Refills: 0 | COMMUNITY
Start: 2017-08-23

## 2017-08-23 RX ORDER — MYCOPHENOLIC ACID 180 MG/1
3 TABLET, DELAYED RELEASE ORAL
Qty: 0 | Refills: 0 | COMMUNITY
Start: 2017-08-23

## 2017-08-23 RX ORDER — ACETAMINOPHEN 500 MG
0 TABLET ORAL
Qty: 0 | Refills: 0 | COMMUNITY

## 2017-08-23 RX ORDER — METHENAMINE MANDELATE 1 G
0 TABLET ORAL
Qty: 0 | Refills: 0 | COMMUNITY

## 2017-08-23 RX ADMIN — MAGNESIUM OXIDE 400 MG ORAL TABLET 400 MILLIGRAM(S): 241.3 TABLET ORAL at 08:03

## 2017-08-23 RX ADMIN — Medication 5 MILLIGRAM(S): at 06:19

## 2017-08-23 RX ADMIN — TACROLIMUS 2 MILLIGRAM(S): 5 CAPSULE ORAL at 06:19

## 2017-08-23 RX ADMIN — MAGNESIUM OXIDE 400 MG ORAL TABLET 400 MILLIGRAM(S): 241.3 TABLET ORAL at 12:25

## 2017-08-23 RX ADMIN — MYCOPHENOLIC ACID 540 MILLIGRAM(S): 180 TABLET, DELAYED RELEASE ORAL at 06:19

## 2017-08-23 RX ADMIN — DEXTROSE MONOHYDRATE, SODIUM CHLORIDE, AND POTASSIUM CHLORIDE 50 MILLILITER(S): 50; .745; 4.5 INJECTION, SOLUTION INTRAVENOUS at 06:19

## 2017-08-23 RX ADMIN — LINEZOLID 300 MILLIGRAM(S): 600 INJECTION, SOLUTION INTRAVENOUS at 06:19

## 2017-08-23 NOTE — DISCHARGE NOTE ADULT - CARE PLAN
Principal Discharge DX:	Sepsis, due to unspecified organism  Goal:	treat infection  Instructions for follow-up, activity and diet:	finish antibiotics  Secondary Diagnosis:	Kidney transplant recipient  Goal:	follow up with specialist at her program  Secondary Diagnosis:	Immunosuppression  Secondary Diagnosis:	Febrile illness, acute  Secondary Diagnosis:	Liver transplant recipient

## 2017-08-23 NOTE — PROGRESS NOTE ADULT - SUBJECTIVE AND OBJECTIVE BOX
Patient seen and examined    Feels fine  no c/o CP SOB NV UTI sx  No swelling feet    Vital Signs Last 24 Hrs  T(C): 36.6 (08-23-17 @ 09:18), Max: 37 (08-22-17 @ 16:00)  T(F): 97.8 (08-23-17 @ 09:18), Max: 98.6 (08-22-17 @ 16:00)  HR: 81 (08-23-17 @ 09:18) (80 - 90)  BP: 117/77 (08-23-17 @ 09:18) (115/72 - 118/74)  BP(mean): --  RR: 18 (08-23-17 @ 09:18) (14 - 18)  SpO2: 99% (08-23-17 @ 09:18) (99% - 100%)    Chest   clear  CV   no murmur  Abd   soft, NT BS+; RLQ renal graft NT  Extr   No edema  CVAT -ve  Neuro  grossly iintact motor      23 Aug 2017 07:09    138    |  104    |  9.0    ----------------------------<  133    4.1     |  21.0   |  0.85     Ca    8.7        23 Aug 2017 07:09  Mg     1.7       23 Aug 2017 07:09    TPro  6.9    /  Alb  3.7    /  TBili  0.2    /  DBili  x      /  AST  11     /  ALT  17     /  AlkPhos  64     23 Aug 2017 07:09                          12.4   6.5   )-----------( 299      ( 23 Aug 2017 07:43 )             38.8         Creat/lytes remain normal  ID noted - cultures -ve  on Levaquin per ID  likely d/c home today

## 2017-08-23 NOTE — PROGRESS NOTE ADULT - PROBLEM SELECTOR PLAN 3
- continue tacrolimus and mycophenolate.
- continue tacrolimus and mycophenolate. AM levels ordered and in process.

## 2017-08-23 NOTE — DISCHARGE NOTE ADULT - PATIENT PORTAL LINK FT
“You can access the Follow[employer] Patient Portal, offered by [facility], by registering with the following website: [email or web address]”

## 2017-08-23 NOTE — PROGRESS NOTE ADULT - SUBJECTIVE AND OBJECTIVE BOX
[facility]  INFECTIOUS DISEASES AND INTERNAL MEDICINE OF [facility]  =======================================================  Anton Spencer MD [facility] Center[facility]VASHI Horan MD  Diplomates American Board of Internal Medicine and Infectious Diseases  =======================================================    GARCIA, DANISHAHOLGER [id number]  chief complaint: WBC elevation, fever,  possible UTI    patient seen and examined in follow up.  Chart and labs reviewed.   Urine culture negative - Final.    Blood culture negative, prelime.     =======================================================  Allergies:  iv contrast (Anaphylaxis)  Motrin (Other)      =======================================================  MEDICATIONS  (STANDING):  enoxaparin Injectable 40 milliGRAM(s) SubCutaneous daily  tacrolimus 2 milliGRAM(s) Oral two times a day  predniSONE   Tablet 5 milliGRAM(s) Oral daily  mycophenolic Acid 540 milliGRAM(s) Oral two times a day  magnesium oxide 400 milliGRAM(s) Oral three times a day with meals  sodium chloride 0.9% with potassium chloride 40 mEq/L 1000 milliLiter(s) (50 mL/Hr) IV Continuous <Continuous>  levoFLOXacin  Tablet 500 milliGRAM(s) Oral every 24 hours    MEDICATIONS  (PRN):  acetaminophen   Tablet 650 milliGRAM(s) Oral every 6 hours PRN For Temp greater than 38 C (100.4 F)  ondansetron Injectable 4 milliGRAM(s) IV Push every 8 hours PRN Nausea and/or Vomiting     =======================================================     REVIEW OF SYSTEMS:  CONSTITUTIONAL:  No Fever or chills  HEENT:   No diplopia or blurred vision.  No earache, sore throat or runny nose.  CARDIOVASCULAR:  No pressure, squeezing, strangling, tightness, heaviness or aching about the chest, neck, axilla or epigastrium.  RESPIRATORY:  No cough, shortness of breath, PND or orthopnea.  GASTROINTESTINAL:  No nausea, vomiting or diarrhea. RESOLVED ABD PAIN.   GENITOURINARY:  No dysuria, frequency or urgency. No Blood in urine  MUSCULOSKELETAL:  no joint aches, no muscle pain  SKIN:  No change in skin, hair or nails.  NEUROLOGIC:  No paresthesias, fasciculations, seizures or weakness.  PSYCHIATRIC:  No disorder of thought or mood.  ENDOCRINE:  No heat or cold intolerance, polyuria or polydipsia.  HEMATOLOGICAL:  No easy bruising or bleeding.     =======================================================    Physical Exam:  Vital Signs Last 24 Hrs  T(C): 37 (22 Aug 2017 23:45), Max: 37 (22 Aug 2017 16:00)  T(F): 98.6 (22 Aug 2017 23:45), Max: 98.6 (22 Aug 2017 16:00)  HR: 90 (22 Aug 2017 23:45) (80 - 90)  BP: 115/72 (22 Aug 2017 23:45) (115/72 - 118/74)  RR: 17 (22 Aug 2017 23:45) (14 - 17)  SpO2: 100% (22 Aug 2017 23:45) (100% - 100%)    GEN: NAD, pleasant  HEENT: normocephalic and atraumatic. EOMI. VAIBHAV.    NECK: Supple. No carotid bruits.  No lymphadenopathy or thyromegaly.  LUNGS: Clear to auscultation.  HEART: Regular rate and rhythm without murmur.  ABDOMEN: Soft, nontender, and nondistended.  Positive bowel sounds.  NO TENDERNESS AT LOWER RIGHT ABD AT TRANSPLANT SITE    : No CVA tenderness  EXTREMITIES: Without any cyanosis, clubbing, rash, lesions or edema.  MSK: no joint swelling  NEUROLOGIC: Cranial nerves II through XII are grossly intact.  PSYCHIATRIC: Appropriate affect .  SKIN: No ulceration or induration present.    =======================================================    Labs:        138  |  104  |  9.0  ----------------------------<  133<H>  4.1   |  21.0<L>  |  0.85    Ca    8.7      23 Aug 2017 07:09  Mg     1.7         TPro  6.9  /  Alb  3.7  /  TBili  0.2<L>  /  DBili  x   /  AST  11  /  ALT  17  /  AlkPhos  64                            12.4   6.5   )-----------( 299      ( 23 Aug 2017 07:43 )             38.8         Urinalysis Basic - ( 22 Aug 2017 10:04 )    Color: Yellow / Appearance: Clear / S.010 / pH: x  Gluc: x / Ketone: Trace  / Bili: Negative / Urobili: Negative mg/dL   Blood: x / Protein: Negative mg/dL / Nitrite: Negative   Leuk Esterase: Negative / RBC: x / WBC x   Sq Epi: x / Non Sq Epi: x / Bacteria: x      LIVER FUNCTIONS - ( 23 Aug 2017 07:09 )  Alb: 3.7 g/dL / Pro: 6.9 g/dL / ALK PHOS: 64 U/L / ALT: 17 U/L / AST: 11 U/L / GGT: x           ========  Culture - Blood (17 @ 13:11)    Specimen Source: .Blood Blood    Culture Results:   No growth at 48 hours    Culture - Urine (17 @ 06:01)    Specimen Source: .Urine Clean Catch (Midstream)    Culture Results:   No growth    Culture - Blood (17 @ 05:05)    Specimen Source: .Blood Blood-Peripheral    Culture Results:   No growth at 48 hours    Culture - Blood (08.20.17 @ 05:04)    Specimen Source: .Blood Blood-Peripheral    Culture Results:   No growth at 48 hours [facility]  INFECTIOUS DISEASES AND INTERNAL MEDIC[facility] [place] [facility]  =======================================================  Anton Spencer MD [facility]   Jose Horan MD  Diplomates American Board of Internal Medicine and Infectious Diseases  =======================================================    GARCIA, DANISHAHOLGER [id number]  chief complaint: WBC elevation, fever,  possible UTI    patient seen and examined in follow up.  Chart and labs reviewed.   Urine culture negative - Final.    Blood culture negative, prelim.     no new complaints.  Feels well    =======================================================  Allergies:  iv contrast (Anaphylaxis)  Motrin (Other)      =======================================================  MEDICATIONS  (STANDING):  enoxaparin Injectable 40 milliGRAM(s) SubCutaneous daily  tacrolimus 2 milliGRAM(s) Oral two times a day  predniSONE   Tablet 5 milliGRAM(s) Oral daily  mycophenolic Acid 540 milliGRAM(s) Oral two times a day  magnesium oxide 400 milliGRAM(s) Oral three times a day with meals  sodium chloride 0.9% with potassium chloride 40 mEq/L 1000 milliLiter(s) (50 mL/Hr) IV Continuous <Continuous>  levoFLOXacin  Tablet 500 milliGRAM(s) Oral every 24 hours    MEDICATIONS  (PRN):  acetaminophen   Tablet 650 milliGRAM(s) Oral every 6 hours PRN For Temp greater than 38 C (100.4 F)  ondansetron Injectable 4 milliGRAM(s) IV Push every 8 hours PRN Nausea and/or Vomiting     =======================================================     REVIEW OF SYSTEMS:  CONSTITUTIONAL:  No Fever or chills  HEENT:   No diplopia or blurred vision.  No earache, sore throat or runny nose.  CARDIOVASCULAR:  No pressure, squeezing, strangling, tightness, heaviness or aching about the chest, neck, axilla or epigastrium.  RESPIRATORY:  No cough, shortness of breath, PND or orthopnea.  GASTROINTESTINAL:  No nausea, vomiting or diarrhea. RESOLVED ABD PAIN.   GENITOURINARY:  No dysuria, frequency or urgency. No Blood in urine  MUSCULOSKELETAL:  no joint aches, no muscle pain  SKIN:  No change in skin, hair or nails.  NEUROLOGIC:  No paresthesias, fasciculations, seizures or weakness.  PSYCHIATRIC:  No disorder of thought or mood.  ENDOCRINE:  No heat or cold intolerance, polyuria or polydipsia.  HEMATOLOGICAL:  No easy bruising or bleeding.     =======================================================    Physical Exam:  Vital Signs Last 24 Hrs  T(C): 37 (22 Aug 2017 23:45), Max: 37 (22 Aug 2017 16:00)  T(F): 98.6 (22 Aug 2017 23:45), Max: 98.6 (22 Aug 2017 16:00)  HR: 90 (22 Aug 2017 23:45) (80 - 90)  BP: 115/72 (22 Aug 2017 23:45) (115/72 - 118/74)  RR: 17 (22 Aug 2017 23:45) (14 - 17)  SpO2: 100% (22 Aug 2017 23:45) (100% - 100%)    GEN: NAD, pleasant  HEENT: normocephalic and atraumatic. EOMI. VAIBHAV.    NECK: Supple. No carotid bruits.  No lymphadenopathy or thyromegaly.  LUNGS: Clear to auscultation.  HEART: Regular rate and rhythm without murmur.  ABDOMEN: Soft, nontender, and nondistended.  Positive bowel sounds.  NO TENDERNESS AT LOWER RIGHT ABD AT TRANSPLANT SITE    : No CVA tenderness  EXTREMITIES: Without any cyanosis, clubbing, rash, lesions or edema.  MSK: no joint swelling  NEUROLOGIC: Cranial nerves II through XII are grossly intact.  PSYCHIATRIC: Appropriate affect .  SKIN: No ulceration or induration present.    =======================================================    Labs:        138  |  104  |  9.0  ----------------------------<  133<H>  4.1   |  21.0<L>  |  0.85    Ca    8.7      23 Aug 2017 07:09  Mg     1.7         TPro  6.9  /  Alb  3.7  /  TBili  0.2<L>  /  DBili  x   /  AST  11  /  ALT  17  /  AlkPhos  64                            12.4   6.5   )-----------( 299      ( 23 Aug 2017 07:43 )             38.8         Urinalysis Basic - ( 22 Aug 2017 10:04 )    Color: Yellow / Appearance: Clear / S.010 / pH: x  Gluc: x / Ketone: Trace  / Bili: Negative / Urobili: Negative mg/dL   Blood: x / Protein: Negative mg/dL / Nitrite: Negative   Leuk Esterase: Negative / RBC: x / WBC x   Sq Epi: x / Non Sq Epi: x / Bacteria: x      LIVER FUNCTIONS - ( 23 Aug 2017 07:09 )  Alb: 3.7 g/dL / Pro: 6.9 g/dL / ALK PHOS: 64 U/L / ALT: 17 U/L / AST: 11 U/L / GGT: x           ========  Culture - Blood (17 @ 13:11)    Specimen Source: .Blood Blood    Culture Results:   No growth at 48 hours    Culture - Urine (17 @ 06:01)    Specimen Source: .Urine Clean Catch (Midstream)    Culture Results:   No growth    Culture - Blood (17 @ 05:05)    Specimen Source: .Blood Blood-Peripheral    Culture Results:   No growth at 48 hours    Culture - Blood (08.20.17 @ 05:04)    Specimen Source: .Blood Blood-Peripheral    Culture Results:   No growth at 48 hours

## 2017-08-23 NOTE — PROGRESS NOTE ADULT - PROBLEM SELECTOR PLAN 2
- as above
- continuing empiric antibiotics for now.  IF cultures are negative in 48 hours, plan to de-escalate therapy.

## 2017-08-23 NOTE — PROGRESS NOTE ADULT - ASSESSMENT
This 26 y.o. F with liver and kidney transplant here for fevers, abd pain at transplant site.   No clear evidence of pyelonephritis on imaging.
26 yr old female on immunosuppression for renal transplant and prior history of liver transplant -renal following -agree with management - continuing antirejection meds for now    sepsis secondary to UTI-  started Invanz will follow cultures , called ID Dr recinos   DVt PPx- lovenox
26 yr old female on immunosuppression for renal transplant and prior history of liver transplant -renal following -agree with management - continuing antirejection meds for now    sepsis secondary to UTI-  started Invanz will follow cultures ,- possible discharge planning -discussed with  ID Dr recinos   DVt PPx- lovenox
This 26 y.o. F with liver and kidney transplant here for fevers, abd pain at transplant site.   No clear evidence of pyelonephritis on imaging.
s/p transplant with UTI.

## 2017-08-23 NOTE — PROGRESS NOTE ADULT - PROBLEM SELECTOR PLAN 1
fevers resolved.   - cultures negative thus far  - will de-escalate antibiotics to cover for UTI -   Levaquin 500mg PO Q24H x 5 days
fevers resolved.   - maintain antibiotics

## 2017-08-23 NOTE — DISCHARGE NOTE ADULT - HOSPITAL COURSE
HPI:  patient is a 26 yr old female with very significant medical history of Liver transplant at the age of 13 and renal transplant appx 6 months go in 12/2016. now for a week was feeling week and  and was asked to hold her doxycycline and her nitrofurantoin but after she her her meds for almost a week she felt worse and so started her nitrofurantoin yesterday and came in to hospital today when she didn't feel better . No reliving or exacerbating conditions, symptoms severe in intensity (20 Aug 2017 11:40)  Pt on immunosuppression for renal transplant and prior history of liver transplant -renal following -agree with management - continued  antirejection meds during hospitalization  Sepsis secondary to UTI-  started Invanz , negative cultures  discharge discussed with  ID Dr recinos - changed to oral antibiotic   DVt PPx- lovenox

## 2017-08-24 LAB — MYCOPHENOLATE SERPL-MCNC: SIGNIFICANT CHANGE UP

## 2017-08-25 LAB
CULTURE RESULTS: SIGNIFICANT CHANGE UP
SPECIMEN SOURCE: SIGNIFICANT CHANGE UP

## 2017-10-19 PROCEDURE — 76776 US EXAM K TRANSPL W/DOPPLER: CPT

## 2017-10-19 PROCEDURE — 87086 URINE CULTURE/COLONY COUNT: CPT

## 2017-10-19 PROCEDURE — 83605 ASSAY OF LACTIC ACID: CPT

## 2017-10-19 PROCEDURE — 36415 COLL VENOUS BLD VENIPUNCTURE: CPT

## 2017-10-19 PROCEDURE — 80053 COMPREHEN METABOLIC PANEL: CPT

## 2017-10-19 PROCEDURE — 99291 CRITICAL CARE FIRST HOUR: CPT | Mod: 25

## 2017-10-19 PROCEDURE — 93005 ELECTROCARDIOGRAM TRACING: CPT

## 2017-10-19 PROCEDURE — 83735 ASSAY OF MAGNESIUM: CPT

## 2017-10-19 PROCEDURE — 96374 THER/PROPH/DIAG INJ IV PUSH: CPT

## 2017-10-19 PROCEDURE — 81001 URINALYSIS AUTO W/SCOPE: CPT

## 2017-10-19 PROCEDURE — 80197 ASSAY OF TACROLIMUS: CPT

## 2017-10-19 PROCEDURE — 84702 CHORIONIC GONADOTROPIN TEST: CPT

## 2017-10-19 PROCEDURE — 71045 X-RAY EXAM CHEST 1 VIEW: CPT

## 2017-10-19 PROCEDURE — 96375 TX/PRO/DX INJ NEW DRUG ADDON: CPT

## 2017-10-19 PROCEDURE — 81003 URINALYSIS AUTO W/O SCOPE: CPT

## 2017-10-19 PROCEDURE — 87040 BLOOD CULTURE FOR BACTERIA: CPT

## 2017-10-19 PROCEDURE — 85027 COMPLETE CBC AUTOMATED: CPT

## 2017-10-19 PROCEDURE — 74176 CT ABD & PELVIS W/O CONTRAST: CPT

## 2017-10-19 PROCEDURE — 80180 DRUG SCRN QUAN MYCOPHENOLATE: CPT

## 2018-03-24 ENCOUNTER — TRANSCRIPTION ENCOUNTER (OUTPATIENT)
Age: 28
End: 2018-03-24

## 2018-10-19 PROBLEM — Z94.0 KIDNEY TRANSPLANT STATUS: Chronic | Status: ACTIVE | Noted: 2017-08-20

## 2018-10-19 PROBLEM — Z94.4 LIVER TRANSPLANT STATUS: Chronic | Status: ACTIVE | Noted: 2017-08-20

## 2018-10-31 PROBLEM — Z00.00 ENCOUNTER FOR PREVENTIVE HEALTH EXAMINATION: Status: ACTIVE | Noted: 2018-10-31

## 2018-11-30 ENCOUNTER — APPOINTMENT (OUTPATIENT)
Dept: NEUROLOGY | Facility: CLINIC | Age: 28
End: 2018-11-30

## 2019-05-24 NOTE — PROGRESS NOTE ADULT - SUBJECTIVE AND OBJECTIVE BOX
Patient seen in ER yesterday for wheezing . Prescribed Albuterol MDI.. Needs note for return to return to .   ATTILA GARCIA Patient is a 26y old  Female who presents with a chief complaint of feeling week and Dizzy (20 Aug 2017 11:40)     HPI:  patient is a 26 yr old female with very significant medical history of Liver transplant at the age of 13 and renal transplant appx 6 months go in 2016. now for a week was feeling week and  and was asked to hold her doxycycline and her nitrofurantoin but after she her her meds for almost a week she felt worse and so started her nitrofurantoin yesterday and came in to hospital today when she didn't feel better . No reliving or exacerbating conditions, symptoms severe in intensity (20 Aug 2017 11:40)    The patient was seen and evaluated feels a lot better , no nausea , vomiting   The patient is in no acute distress.  Denied any fever chest pain, palpitations, shortness of breath, abdominal pain, fever, dysuria, cough, edema       I&O's Summary    21 Aug 2017 07:01  -  22 Aug 2017 07:00  --------------------------------------------------------  IN: 1000 mL / OUT: 0 mL / NET: 1000 mL      Allergies    iv contrast (Anaphylaxis)  Motrin (Other)    Intolerances      HEALTH ISSUES - PROBLEM Dx:  Kidney transplant recipient: Kidney transplant recipient  Liver transplant recipient: Liver transplant recipient  Immunosuppression: Immunosuppression  Sepsis, due to unspecified organism: Sepsis, due to unspecified organism  Febrile illness, acute: Febrile illness, acute        PAST MEDICAL & SURGICAL HISTORY:  Liver transplant recipient  Kidney transplant recipient  No significant past surgical history          Vital Signs Last 24 Hrs  T(C): 36.7 (22 Aug 2017 08:17), Max: 37.2 (21 Aug 2017 19:15)  T(F): 98.1 (22 Aug 2017 08:17), Max: 99 (21 Aug 2017 19:15)  HR: 81 (22 Aug 2017 08:17) (75 - 84)  BP: 104/69 (22 Aug 2017 08:17) (104/69 - 118/78)  BP(mean): --  RR: 18 (22 Aug 2017 08:17) (16 - 18)  SpO2: 99% (22 Aug 2017 08:17) (99% - 100%)T(C): 36.7 (22 Aug 2017 08:17), Max: 37.2 (21 Aug 2017 19:15)  HR: 81 (22 Aug 2017 08:17) (75 - 84)  BP: 104/69 (22 Aug 2017 08:17) (104/69 - 118/78)  RR: 18 (22 Aug 2017 08:17) (16 - 18)  SpO2: 99% (22 Aug 2017 08:17) (99% - 100%)  Wt(kg): --    PHYSICAL EXAM:    GENERAL: NAD, well-groomed, well-developed  HEAD:  Atraumatic, Normocephalic  EYES: EOMI, PERRLA, conjunctiva and sclera clear  ENMT:  Moist mucous membranes,  No lesions  NECK: Supple, No JVD, Normal thyroid  NERVOUS SYSTEM:  Alert & Oriented X3,  Moves upper and lower extremities; CNS-II-XII  CHEST/LUNG: Clear to auscultation bilaterally; No rales, rhonchi, wheezing,   HEART: Regular rate and rhythm; No murmurs,   ABDOMEN: Soft, Nontender, Nondistended; Bowel sounds present  EXTREMITIES:  Peripheral Pulses, No  cyanosis, or edema  SKIN: No rashes or lesions  psychiatry- mood and affect approprite, Insight and judgement intact     ertapenem  IVPB   IV Intermittent   enoxaparin Injectable 40 milliGRAM(s) SubCutaneous daily  ertapenem  IVPB 1000 milliGRAM(s) IV Intermittent every 24 hours  tacrolimus 2 milliGRAM(s) Oral two times a day  predniSONE   Tablet 5 milliGRAM(s) Oral daily  linezolid  IVPB   IV Intermittent   linezolid  IVPB 600 milliGRAM(s) IV Intermittent every 12 hours  acetaminophen   Tablet 650 milliGRAM(s) Oral every 6 hours PRN  ondansetron Injectable 4 milliGRAM(s) IV Push every 8 hours PRN  mycophenolic Acid 540 milliGRAM(s) Oral two times a day  magnesium oxide 400 milliGRAM(s) Oral three times a day with meals  sodium chloride 0.9% with potassium chloride 40 mEq/L 1000 milliLiter(s) IV Continuous <Continuous>      LABS:                          12.6   10.2  )-----------( 241      ( 21 Aug 2017 05:54 )             39.5     08-22    134<L>  |  100  |  11.0  ----------------------------<  132<H>  3.6   |  22.0  |  0.69    Ca    8.1<L>      22 Aug 2017 06:40    TPro  6.0<L>  /  Alb  3.0<L>  /  TBili  <0.2<L>  /  DBili  x   /  AST  12  /  ALT  19  /  AlkPhos  53  08-22    LIVER FUNCTIONS - ( 22 Aug 2017 06:40 )  Alb: 3.0 g/dL / Pro: 6.0 g/dL / ALK PHOS: 53 U/L / ALT: 19 U/L / AST: 12 U/L / GGT: x                 Urinalysis Basic - ( 22 Aug 2017 10:04 )    Color: Yellow / Appearance: Clear / S.010 / pH: x  Gluc: x / Ketone: Trace  / Bili: Negative / Urobili: Negative mg/dL   Blood: x / Protein: Negative mg/dL / Nitrite: Negative   Leuk Esterase: Negative / RBC: x / WBC x   Sq Epi: x / Non Sq Epi: x / Bacteria: x      CAPILLARY BLOOD GLUCOSE          RADIOLOGY & ADDITIONAL TESTS:      Consultant notes reviewed    Case discussed with consultant/provider/ family /patient

## 2020-07-05 NOTE — ED PROVIDER NOTE - OBJECTIVE STATEMENT
25 yo f with fever, lower abd pain. pt has h/o liver/renal transplants. pain started earlier today. nothing makes better or worse. sx started shortly and got worse None

## 2021-07-30 NOTE — ED ADULT NURSE REASSESSMENT NOTE - STATUS
Impression: S/P Cataract Extraction by phacoemulsification with IOL placement; ORA; DEXYCU OD - 1 Day. Encounter for surgical aftercare following surgery on a sense organ  Z48.810. Plan: keep scheduled appt --Advised patient to use artificial tears for comfort.
awaiting bed, no change

## 2025-02-24 NOTE — PATIENT PROFILE ADULT. - COMFORT LEVEL, ACCEPTABLE
Message from Kathryn Bear NP sent at 2/24/2025  1:56 PM CST   Stable. Inform patient    ++++++++++++++++++++++++++++++++++++++  Left detailed voicemail message with patient Re: CMP drawn 2/20/2025  Has now been evaluated by the Pulmonary Team  and is stable     Writer reminded patient that he needs to have lab work completed every 2 weeks while on the OFEV  next due  March 6, 2025    Writer encouraged if patient has any additional questions or concerns - he should reach out to us here in the clinic - contact information provided   
7